# Patient Record
Sex: MALE | Race: OTHER | NOT HISPANIC OR LATINO
[De-identification: names, ages, dates, MRNs, and addresses within clinical notes are randomized per-mention and may not be internally consistent; named-entity substitution may affect disease eponyms.]

---

## 2022-07-12 ENCOUNTER — APPOINTMENT (OUTPATIENT)
Dept: SURGICAL ONCOLOGY | Facility: CLINIC | Age: 77
End: 2022-07-12

## 2022-07-12 VITALS
HEART RATE: 72 BPM | SYSTOLIC BLOOD PRESSURE: 126 MMHG | OXYGEN SATURATION: 99 % | TEMPERATURE: 97.7 F | HEIGHT: 63 IN | DIASTOLIC BLOOD PRESSURE: 79 MMHG | BODY MASS INDEX: 28.35 KG/M2 | RESPIRATION RATE: 16 BRPM | WEIGHT: 160 LBS

## 2022-07-12 DIAGNOSIS — E11.9 TYPE 2 DIABETES MELLITUS W/OUT COMPLICATIONS: ICD-10-CM

## 2022-07-12 DIAGNOSIS — Z78.9 OTHER SPECIFIED HEALTH STATUS: ICD-10-CM

## 2022-07-12 DIAGNOSIS — I10 ESSENTIAL (PRIMARY) HYPERTENSION: ICD-10-CM

## 2022-07-12 DIAGNOSIS — N40.0 BENIGN PROSTATIC HYPERPLASIA WITHOUT LOWER URINARY TRACT SYMPMS: ICD-10-CM

## 2022-07-12 PROBLEM — Z00.00 ENCOUNTER FOR PREVENTIVE HEALTH EXAMINATION: Status: ACTIVE | Noted: 2022-07-12

## 2022-07-12 PROCEDURE — 99204 OFFICE O/P NEW MOD 45 MIN: CPT

## 2022-07-14 PROBLEM — I10 HTN (HYPERTENSION): Status: ACTIVE | Noted: 2022-07-14

## 2022-07-14 PROBLEM — Z78.9 NO FAMILY HISTORY OF CANCER: Status: ACTIVE | Noted: 2022-07-14

## 2022-07-14 PROBLEM — N40.0 BPH (BENIGN PROSTATIC HYPERPLASIA): Status: ACTIVE | Noted: 2022-07-14

## 2022-07-14 PROBLEM — E11.9 DIABETES: Status: ACTIVE | Noted: 2022-07-14

## 2022-07-14 RX ORDER — METFORMIN HYDROCHLORIDE 1000 MG/1
1000 TABLET, COATED ORAL
Refills: 0 | Status: ACTIVE | COMMUNITY

## 2022-07-14 RX ORDER — FINASTERIDE 5 MG/1
5 TABLET, FILM COATED ORAL DAILY
Refills: 0 | Status: ACTIVE | COMMUNITY

## 2022-07-14 RX ORDER — TAMSULOSIN HCL 0.4 MG
0.4 CAPSULE ORAL
Refills: 0 | Status: ACTIVE | COMMUNITY

## 2022-07-14 RX ORDER — AMLODIPINE BESYLATE 5 MG/1
5 TABLET ORAL DAILY
Refills: 0 | Status: ACTIVE | COMMUNITY

## 2022-07-14 RX ORDER — LISINOPRIL 20 MG/1
20 TABLET ORAL DAILY
Refills: 0 | Status: ACTIVE | COMMUNITY

## 2022-07-15 ENCOUNTER — TRANSCRIPTION ENCOUNTER (OUTPATIENT)
Age: 77
End: 2022-07-15

## 2022-07-19 ENCOUNTER — OUTPATIENT (OUTPATIENT)
Dept: OUTPATIENT SERVICES | Facility: HOSPITAL | Age: 77
LOS: 1 days | End: 2022-07-19

## 2022-07-19 VITALS
OXYGEN SATURATION: 99 % | HEIGHT: 61 IN | SYSTOLIC BLOOD PRESSURE: 130 MMHG | TEMPERATURE: 97 F | RESPIRATION RATE: 16 BRPM | DIASTOLIC BLOOD PRESSURE: 82 MMHG | HEART RATE: 74 BPM | WEIGHT: 160.06 LBS

## 2022-07-19 DIAGNOSIS — D49.0 NEOPLASM OF UNSPECIFIED BEHAVIOR OF DIGESTIVE SYSTEM: ICD-10-CM

## 2022-07-19 DIAGNOSIS — E11.9 TYPE 2 DIABETES MELLITUS WITHOUT COMPLICATIONS: ICD-10-CM

## 2022-07-19 DIAGNOSIS — Z98.890 OTHER SPECIFIED POSTPROCEDURAL STATES: Chronic | ICD-10-CM

## 2022-07-19 DIAGNOSIS — Z87.898 PERSONAL HISTORY OF OTHER SPECIFIED CONDITIONS: ICD-10-CM

## 2022-07-19 DIAGNOSIS — Z87.438 PERSONAL HISTORY OF OTHER DISEASES OF MALE GENITAL ORGANS: ICD-10-CM

## 2022-07-19 DIAGNOSIS — I10 ESSENTIAL (PRIMARY) HYPERTENSION: ICD-10-CM

## 2022-07-19 LAB
A1C WITH ESTIMATED AVERAGE GLUCOSE RESULT: 6.6 % — HIGH (ref 4–5.6)
ALBUMIN SERPL ELPH-MCNC: 4.5 G/DL — SIGNIFICANT CHANGE UP (ref 3.3–5)
ALP SERPL-CCNC: 66 U/L — SIGNIFICANT CHANGE UP (ref 40–120)
ALT FLD-CCNC: 10 U/L — SIGNIFICANT CHANGE UP (ref 4–41)
ANION GAP SERPL CALC-SCNC: 12 MMOL/L — SIGNIFICANT CHANGE UP (ref 7–14)
APTT BLD: 28.6 SEC — SIGNIFICANT CHANGE UP (ref 27–36.3)
AST SERPL-CCNC: 14 U/L — SIGNIFICANT CHANGE UP (ref 4–40)
BILIRUB SERPL-MCNC: 0.2 MG/DL — SIGNIFICANT CHANGE UP (ref 0.2–1.2)
BLD GP AB SCN SERPL QL: NEGATIVE — SIGNIFICANT CHANGE UP
BUN SERPL-MCNC: 20 MG/DL — SIGNIFICANT CHANGE UP (ref 7–23)
CALCIUM SERPL-MCNC: 9.6 MG/DL — SIGNIFICANT CHANGE UP (ref 8.4–10.5)
CHLORIDE SERPL-SCNC: 99 MMOL/L — SIGNIFICANT CHANGE UP (ref 98–107)
CO2 SERPL-SCNC: 26 MMOL/L — SIGNIFICANT CHANGE UP (ref 22–31)
CREAT SERPL-MCNC: 0.76 MG/DL — SIGNIFICANT CHANGE UP (ref 0.5–1.3)
EGFR: 93 ML/MIN/1.73M2 — SIGNIFICANT CHANGE UP
ESTIMATED AVERAGE GLUCOSE: 143 — SIGNIFICANT CHANGE UP
GLUCOSE SERPL-MCNC: 96 MG/DL — SIGNIFICANT CHANGE UP (ref 70–99)
HCT VFR BLD CALC: 40.6 % — SIGNIFICANT CHANGE UP (ref 39–50)
HGB BLD-MCNC: 13 G/DL — SIGNIFICANT CHANGE UP (ref 13–17)
INR BLD: 1.11 RATIO — SIGNIFICANT CHANGE UP (ref 0.88–1.16)
MCHC RBC-ENTMCNC: 31.3 PG — SIGNIFICANT CHANGE UP (ref 27–34)
MCHC RBC-ENTMCNC: 32 GM/DL — SIGNIFICANT CHANGE UP (ref 32–36)
MCV RBC AUTO: 97.6 FL — SIGNIFICANT CHANGE UP (ref 80–100)
NRBC # BLD: 0 /100 WBCS — SIGNIFICANT CHANGE UP
NRBC # FLD: 0 K/UL — SIGNIFICANT CHANGE UP
PLATELET # BLD AUTO: 201 K/UL — SIGNIFICANT CHANGE UP (ref 150–400)
POTASSIUM SERPL-MCNC: 4.6 MMOL/L — SIGNIFICANT CHANGE UP (ref 3.5–5.3)
POTASSIUM SERPL-SCNC: 4.6 MMOL/L — SIGNIFICANT CHANGE UP (ref 3.5–5.3)
PROT SERPL-MCNC: 7.3 G/DL — SIGNIFICANT CHANGE UP (ref 6–8.3)
PROTHROM AB SERPL-ACNC: 12.9 SEC — SIGNIFICANT CHANGE UP (ref 10.5–13.4)
RBC # BLD: 4.16 M/UL — LOW (ref 4.2–5.8)
RBC # FLD: 14.4 % — SIGNIFICANT CHANGE UP (ref 10.3–14.5)
RH IG SCN BLD-IMP: POSITIVE — SIGNIFICANT CHANGE UP
SODIUM SERPL-SCNC: 137 MMOL/L — SIGNIFICANT CHANGE UP (ref 135–145)
WBC # BLD: 5.05 K/UL — SIGNIFICANT CHANGE UP (ref 3.8–10.5)
WBC # FLD AUTO: 5.05 K/UL — SIGNIFICANT CHANGE UP (ref 3.8–10.5)

## 2022-07-19 PROCEDURE — 93010 ELECTROCARDIOGRAM REPORT: CPT

## 2022-07-19 RX ORDER — FINASTERIDE 5 MG/1
1 TABLET, FILM COATED ORAL
Qty: 0 | Refills: 0 | DISCHARGE

## 2022-07-19 RX ORDER — TAMSULOSIN HYDROCHLORIDE 0.4 MG/1
1 CAPSULE ORAL
Qty: 0 | Refills: 0 | DISCHARGE

## 2022-07-19 NOTE — H&P PST ADULT - HISTORY OF PRESENT ILLNESS
Pt is a 77 y.o. male ; pt  reports hematuria ; pt s/p CT scan ; pt states " the pancreas has a dilated duct " Pt to surgeon ; pt now presents for Whipple    Pt denies abdominal pain , denies n/v, denies jaundice  Pt is a 77 y.o. male ; pt  reports hematuria ; pt states a w/u was done ;  s/p CT scan ; pt states " the pancreas has a dilated duct " Pt to surgeon ; pt now presents for Whipple    Pt denies abdominal pain , denies n/v, denies jaundice  Pt is a 77 y.o. male ; pt  reports hematuria ; pt states a w/u was done ;  s/p CT scan ; pt states " the pancreas  duct is dilated " Pt to surgeon ;pt s/p EUS ;  pt now presents for Whipple    Pt denies abdominal pain , denies n/v, denies jaundice

## 2022-07-19 NOTE — H&P PST ADULT - PROBLEM SELECTOR PLAN 1
[place]    Pre op instructions including Hibiclens with teach back reviewed with pt ; pt verbalized good understanding of pre op instructions    Pt aware regarding Pre op Covid test ; pt states will review with surgeons office regarding Location    Pt with h/o HTN, DM. BPH ; Dr Brambila to provide pre op Medical clearance

## 2022-07-19 NOTE — H&P PST ADULT - ANESTHESIA, PREVIOUS REACTION, PROFILE
Pt denies h/o general anesthesia ; s/p IV sedation for Colonoscopy , Endoscopy . Pt denies family history anesthesia complications Pt denies h/o general anesthesia ; s/p IV sedation for Colonoscopy , Endoscopy . Pt denies family history anesthesia complications/none

## 2022-07-19 NOTE — H&P PST ADULT - NSICDXPASTMEDICALHX_GEN_ALL_CORE_FT
PAST MEDICAL HISTORY:  COVID 1/22    Diabetes mellitus  am    Eczema     History of BPH     HTN (hypertension)

## 2022-07-26 NOTE — ASU PATIENT PROFILE, ADULT - FALL HARM RISK - RISK INTERVENTIONS

## 2022-07-26 NOTE — ASU PATIENT PROFILE, ADULT - ANESTHESIA, PREVIOUS REACTION, PROFILE
Pt denies h/o general anesthesia ; s/p IV sedation for Colonoscopy , Endoscopy . Pt denies family history anesthesia complications/none

## 2022-07-27 ENCOUNTER — INPATIENT (INPATIENT)
Facility: HOSPITAL | Age: 77
LOS: 5 days | Discharge: HOME HEALTH | End: 2022-08-02
Attending: SURGERY | Admitting: SURGERY

## 2022-07-27 ENCOUNTER — RESULT REVIEW (OUTPATIENT)
Age: 77
End: 2022-07-27

## 2022-07-27 ENCOUNTER — APPOINTMENT (OUTPATIENT)
Dept: SURGICAL ONCOLOGY | Facility: HOSPITAL | Age: 77
End: 2022-07-27

## 2022-07-27 VITALS
RESPIRATION RATE: 16 BRPM | WEIGHT: 160.06 LBS | SYSTOLIC BLOOD PRESSURE: 133 MMHG | TEMPERATURE: 97 F | OXYGEN SATURATION: 99 % | HEART RATE: 82 BPM | DIASTOLIC BLOOD PRESSURE: 74 MMHG | HEIGHT: 61 IN

## 2022-07-27 DIAGNOSIS — Z98.890 OTHER SPECIFIED POSTPROCEDURAL STATES: Chronic | ICD-10-CM

## 2022-07-27 DIAGNOSIS — D49.0 NEOPLASM OF UNSPECIFIED BEHAVIOR OF DIGESTIVE SYSTEM: ICD-10-CM

## 2022-07-27 LAB
ALBUMIN SERPL ELPH-MCNC: 3.8 G/DL — SIGNIFICANT CHANGE UP (ref 3.3–5)
ALP SERPL-CCNC: 48 U/L — SIGNIFICANT CHANGE UP (ref 40–120)
ALT FLD-CCNC: 61 U/L — HIGH (ref 4–41)
AMYLASE P1 CFR SERPL: 41 U/L — SIGNIFICANT CHANGE UP (ref 25–125)
ANION GAP SERPL CALC-SCNC: 19 MMOL/L — HIGH (ref 7–14)
APTT BLD: 26.4 SEC — LOW (ref 27–36.3)
AST SERPL-CCNC: 61 U/L — HIGH (ref 4–40)
BILIRUB SERPL-MCNC: 0.5 MG/DL — SIGNIFICANT CHANGE UP (ref 0.2–1.2)
BUN SERPL-MCNC: 18 MG/DL — SIGNIFICANT CHANGE UP (ref 7–23)
CALCIUM SERPL-MCNC: 8.3 MG/DL — LOW (ref 8.4–10.5)
CHLORIDE SERPL-SCNC: 99 MMOL/L — SIGNIFICANT CHANGE UP (ref 98–107)
CO2 SERPL-SCNC: 17 MMOL/L — LOW (ref 22–31)
CREAT SERPL-MCNC: 0.65 MG/DL — SIGNIFICANT CHANGE UP (ref 0.5–1.3)
EGFR: 98 ML/MIN/1.73M2 — SIGNIFICANT CHANGE UP
GAS PNL BLDA: SIGNIFICANT CHANGE UP
GLUCOSE BLDC GLUCOMTR-MCNC: 135 MG/DL — HIGH (ref 70–99)
GLUCOSE BLDC GLUCOMTR-MCNC: 165 MG/DL — HIGH (ref 70–99)
GLUCOSE SERPL-MCNC: 188 MG/DL — HIGH (ref 70–99)
HCT VFR BLD CALC: 38.3 % — LOW (ref 39–50)
HGB BLD-MCNC: 12.4 G/DL — LOW (ref 13–17)
INR BLD: 1.23 RATIO — HIGH (ref 0.88–1.16)
MAGNESIUM SERPL-MCNC: 1.2 MG/DL — LOW (ref 1.6–2.6)
MCHC RBC-ENTMCNC: 30.8 PG — SIGNIFICANT CHANGE UP (ref 27–34)
MCHC RBC-ENTMCNC: 32.4 GM/DL — SIGNIFICANT CHANGE UP (ref 32–36)
MCV RBC AUTO: 95 FL — SIGNIFICANT CHANGE UP (ref 80–100)
NRBC # BLD: 0 /100 WBCS — SIGNIFICANT CHANGE UP
NRBC # FLD: 0 K/UL — SIGNIFICANT CHANGE UP
PHOSPHATE SERPL-MCNC: 3.4 MG/DL — SIGNIFICANT CHANGE UP (ref 2.5–4.5)
PLATELET # BLD AUTO: 170 K/UL — SIGNIFICANT CHANGE UP (ref 150–400)
POTASSIUM SERPL-MCNC: 3.8 MMOL/L — SIGNIFICANT CHANGE UP (ref 3.5–5.3)
POTASSIUM SERPL-SCNC: 3.8 MMOL/L — SIGNIFICANT CHANGE UP (ref 3.5–5.3)
PROT SERPL-MCNC: 5.8 G/DL — LOW (ref 6–8.3)
PROTHROM AB SERPL-ACNC: 14.3 SEC — HIGH (ref 10.5–13.4)
RBC # BLD: 4.03 M/UL — LOW (ref 4.2–5.8)
RBC # FLD: 14.1 % — SIGNIFICANT CHANGE UP (ref 10.3–14.5)
RH IG SCN BLD-IMP: POSITIVE — SIGNIFICANT CHANGE UP
SODIUM SERPL-SCNC: 135 MMOL/L — SIGNIFICANT CHANGE UP (ref 135–145)
WBC # BLD: 10.76 K/UL — HIGH (ref 3.8–10.5)
WBC # FLD AUTO: 10.76 K/UL — HIGH (ref 3.8–10.5)

## 2022-07-27 PROCEDURE — 88309 TISSUE EXAM BY PATHOLOGIST: CPT | Mod: 26

## 2022-07-27 PROCEDURE — 88304 TISSUE EXAM BY PATHOLOGIST: CPT | Mod: 26

## 2022-07-27 PROCEDURE — 49905 OMENTAL FLAP INTRA-ABDOM: CPT

## 2022-07-27 PROCEDURE — 48150 PARTIAL REMOVAL OF PANCREAS: CPT

## 2022-07-27 PROCEDURE — 88307 TISSUE EXAM BY PATHOLOGIST: CPT | Mod: 26

## 2022-07-27 PROCEDURE — 99222 1ST HOSP IP/OBS MODERATE 55: CPT

## 2022-07-27 PROCEDURE — 97607 NEG PRS WND THR NDME<=50SQCM: CPT

## 2022-07-27 DEVICE — STAPLER ETHICON ECHELON ENDOPATH VASCULAR 35MM WHITE RELOAD: Type: IMPLANTABLE DEVICE | Status: FUNCTIONAL

## 2022-07-27 DEVICE — STAPLER COVIDIEN TRI-STAPLE 60MM BLACK RELOAD: Type: IMPLANTABLE DEVICE | Status: FUNCTIONAL

## 2022-07-27 DEVICE — STAPLER ETHICON ECHELON ENDOPATH GRIP SURFACE 60MM WHITE RELOAD: Type: IMPLANTABLE DEVICE | Status: FUNCTIONAL

## 2022-07-27 RX ORDER — SODIUM CHLORIDE 9 MG/ML
1000 INJECTION, SOLUTION INTRAVENOUS
Refills: 0 | Status: DISCONTINUED | OUTPATIENT
Start: 2022-07-27 | End: 2022-07-28

## 2022-07-27 RX ORDER — HYDROMORPHONE HYDROCHLORIDE 2 MG/ML
1 INJECTION INTRAMUSCULAR; INTRAVENOUS; SUBCUTANEOUS
Refills: 0 | Status: DISCONTINUED | OUTPATIENT
Start: 2022-07-27 | End: 2022-07-27

## 2022-07-27 RX ORDER — LISINOPRIL 2.5 MG/1
1 TABLET ORAL
Qty: 0 | Refills: 0 | DISCHARGE

## 2022-07-27 RX ORDER — SODIUM CHLORIDE 9 MG/ML
1000 INJECTION, SOLUTION INTRAVENOUS
Refills: 0 | Status: DISCONTINUED | OUTPATIENT
Start: 2022-07-27 | End: 2022-07-27

## 2022-07-27 RX ORDER — HYDROMORPHONE HYDROCHLORIDE 2 MG/ML
250 INJECTION INTRAMUSCULAR; INTRAVENOUS; SUBCUTANEOUS
Refills: 0 | Status: DISCONTINUED | OUTPATIENT
Start: 2022-07-27 | End: 2022-08-01

## 2022-07-27 RX ORDER — ONDANSETRON 8 MG/1
4 TABLET, FILM COATED ORAL EVERY 6 HOURS
Refills: 0 | Status: DISCONTINUED | OUTPATIENT
Start: 2022-07-27 | End: 2022-08-02

## 2022-07-27 RX ORDER — METFORMIN HYDROCHLORIDE 850 MG/1
2 TABLET ORAL
Qty: 0 | Refills: 0 | DISCHARGE

## 2022-07-27 RX ORDER — PANTOPRAZOLE SODIUM 20 MG/1
40 TABLET, DELAYED RELEASE ORAL EVERY 24 HOURS
Refills: 0 | Status: DISCONTINUED | OUTPATIENT
Start: 2022-07-27 | End: 2022-07-31

## 2022-07-27 RX ORDER — AMLODIPINE BESYLATE 2.5 MG/1
1 TABLET ORAL
Qty: 0 | Refills: 0 | DISCHARGE

## 2022-07-27 RX ORDER — FINASTERIDE 5 MG/1
1 TABLET, FILM COATED ORAL
Qty: 0 | Refills: 0 | DISCHARGE

## 2022-07-27 RX ORDER — NALOXONE HYDROCHLORIDE 4 MG/.1ML
0.1 SPRAY NASAL
Refills: 0 | Status: DISCONTINUED | OUTPATIENT
Start: 2022-07-27 | End: 2022-08-02

## 2022-07-27 RX ORDER — ONDANSETRON 8 MG/1
4 TABLET, FILM COATED ORAL ONCE
Refills: 0 | Status: DISCONTINUED | OUTPATIENT
Start: 2022-07-27 | End: 2022-07-27

## 2022-07-27 RX ORDER — ACETAMINOPHEN 500 MG
1000 TABLET ORAL ONCE
Refills: 0 | Status: DISCONTINUED | OUTPATIENT
Start: 2022-07-27 | End: 2022-07-27

## 2022-07-27 RX ORDER — TAMSULOSIN HYDROCHLORIDE 0.4 MG/1
1 CAPSULE ORAL
Qty: 0 | Refills: 0 | DISCHARGE

## 2022-07-27 RX ORDER — CALCIUM GLUCONATE 100 MG/ML
2 VIAL (ML) INTRAVENOUS ONCE
Refills: 0 | Status: COMPLETED | OUTPATIENT
Start: 2022-07-27 | End: 2022-07-27

## 2022-07-27 RX ORDER — POTASSIUM CHLORIDE 20 MEQ
10 PACKET (EA) ORAL
Refills: 0 | Status: COMPLETED | OUTPATIENT
Start: 2022-07-27 | End: 2022-07-27

## 2022-07-27 RX ORDER — HEPARIN SODIUM 5000 [USP'U]/ML
5000 INJECTION INTRAVENOUS; SUBCUTANEOUS EVERY 8 HOURS
Refills: 0 | Status: DISCONTINUED | OUTPATIENT
Start: 2022-07-27 | End: 2022-08-01

## 2022-07-27 RX ORDER — HYDROMORPHONE HYDROCHLORIDE 2 MG/ML
0.5 INJECTION INTRAMUSCULAR; INTRAVENOUS; SUBCUTANEOUS
Refills: 0 | Status: DISCONTINUED | OUTPATIENT
Start: 2022-07-27 | End: 2022-07-27

## 2022-07-27 RX ORDER — ACETAMINOPHEN 500 MG
1000 TABLET ORAL EVERY 6 HOURS
Refills: 0 | Status: COMPLETED | OUTPATIENT
Start: 2022-07-27 | End: 2022-07-28

## 2022-07-27 RX ORDER — MAGNESIUM SULFATE 500 MG/ML
2 VIAL (ML) INJECTION ONCE
Refills: 0 | Status: COMPLETED | OUTPATIENT
Start: 2022-07-27 | End: 2022-07-27

## 2022-07-27 RX ORDER — ACETAMINOPHEN 500 MG
1000 TABLET ORAL EVERY 6 HOURS
Refills: 0 | Status: DISCONTINUED | OUTPATIENT
Start: 2022-07-27 | End: 2022-07-27

## 2022-07-27 RX ORDER — HYDROMORPHONE HYDROCHLORIDE 2 MG/ML
250 INJECTION INTRAMUSCULAR; INTRAVENOUS; SUBCUTANEOUS
Refills: 0 | Status: DISCONTINUED | OUTPATIENT
Start: 2022-07-27 | End: 2022-07-27

## 2022-07-27 RX ADMIN — HYDROMORPHONE HYDROCHLORIDE 250 MILLILITER(S): 2 INJECTION INTRAMUSCULAR; INTRAVENOUS; SUBCUTANEOUS at 19:13

## 2022-07-27 RX ADMIN — Medication 100 MILLIEQUIVALENT(S): at 15:25

## 2022-07-27 RX ADMIN — PANTOPRAZOLE SODIUM 40 MILLIGRAM(S): 20 TABLET, DELAYED RELEASE ORAL at 16:21

## 2022-07-27 RX ADMIN — Medication 400 MILLIGRAM(S): at 23:28

## 2022-07-27 RX ADMIN — HYDROMORPHONE HYDROCHLORIDE 250 MILLILITER(S): 2 INJECTION INTRAMUSCULAR; INTRAVENOUS; SUBCUTANEOUS at 13:53

## 2022-07-27 RX ADMIN — Medication 25 GRAM(S): at 16:21

## 2022-07-27 RX ADMIN — Medication 100 MILLIEQUIVALENT(S): at 17:26

## 2022-07-27 RX ADMIN — SODIUM CHLORIDE 100 MILLILITER(S): 9 INJECTION, SOLUTION INTRAVENOUS at 23:28

## 2022-07-27 RX ADMIN — HEPARIN SODIUM 5000 UNIT(S): 5000 INJECTION INTRAVENOUS; SUBCUTANEOUS at 23:28

## 2022-07-27 RX ADMIN — Medication 1000 MILLIGRAM(S): at 23:35

## 2022-07-27 RX ADMIN — Medication 200 GRAM(S): at 16:52

## 2022-07-27 NOTE — BRIEF OPERATIVE NOTE - NSICDXBRIEFPROCEDURE_GEN_ALL_CORE_FT
PROCEDURES:  Pancreatectomy, Whipple type, with pancreatojejunostomy 27-Jul-2022 13:41:22  Miguel Terrazas

## 2022-07-27 NOTE — BRIEF OPERATIVE NOTE - OPERATION/FINDINGS
ex. lap, no evidence of metastatic disease, duodenum kocherized, lesser sac entered, hepatoduodenal ligament dissected, GDA identified and ligated, CBD ligated, tunnel created posterior to pancreatic neck, pancreatic neck divided, distal stomach divided, jejunum divided proximally, hemostasis achieved, pancreaticojejunostomy created using interrupted 5-0PDS, hepaticojejunostomy created using interrupted 5-0PDS, gastrojejunostomy completed, Coleman drain x2 place superior anterior to HJ and PJ, and inferior drain posterior to HJ and PJ

## 2022-07-27 NOTE — CONSULT NOTE ADULT - SUBJECTIVE AND OBJECTIVE BOX
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 Ahn. SICU consulted for hemodynamic monitoring in the post-operative period.    Surgery Information -   Case Duration:        EBL:  IV Fluids:         Blood Products:         Complications:        PAST MEDICAL & SURGICAL HISTORY:  HTN (hypertension)  History of BPH  Diabetes mellitus  am  COVID (1/22)  Eczema S/P colonoscopy  S/P endoscopy    Allergies: NKDA    FAMILY HISTORY:  No pertinent family history in first degree relatives    ADVANCE DIRECTIVES: Full Code    REVIEW OF SYSTEMS:  General: Non-Contributory  Skin/Breast: Non-Contributory  Ophthalmologic: Non-Contributory  ENMT: Non-Contributory  Respiratory and Thorax: Non-Contributory  Cardiovascular: Non-Contributory  Gastrointestinal: Non-Contributory  Genitourinary: Non-Contributory  Musculoskeletal: Non-Contributory  Neurological: Non-Contributory  Psychiatric: Non-Contributory  Hematology/Lymphatics: Non-Contributory  Endocrine: Non-Contributory  Allergic/Immunologic: Non-Contributory  --------------------------------------------------------------------------------------  HOME MEDICATIONS:   MEDICATIONS  (STANDING):  hydromorphone (10 MICROgram(s)/mL) + bupivacaine 0.0625% in 0.9% Sodium Chloride PCEA 250 milliLiter(s) Epidural PCA Continuous  lactated ringers. 1000 milliLiter(s) (30 mL/Hr) IV Continuous <Continuous>    MEDICATIONS  (PRN):  HYDROmorphone  Injectable 0.5 milliGRAM(s) IV Push every 10 minutes PRN Moderate Pain (4 - 6)  HYDROmorphone  Injectable 1 milliGRAM(s) IV Push every 10 minutes PRN Severe Pain (7 - 10)  hydromorphone (10 MICROgram(s)/mL) + bupivacaine 0.0625% in 0.9% Sodium Chloride PCEA Rescue Clinician  Bolus 5 milliLiter(s) Epidural every 15 minutes PRN for Pain Score greater than 6  naloxone Injectable 0.1 milliGRAM(s) IV Push every 3 minutes PRN For ANY of the following changes in patient status:  A. RR LESS THAN 10 breaths per minute, B. Oxygen saturation LESS THAN 90%, C. Sedation score of 6  ondansetron Injectable 4 milliGRAM(s) IV Push every 6 hours PRN Nausea  ondansetron Injectable 4 milliGRAM(s) IV Push once PRN Nausea and/or Vomiting  --------------------------------------------------------------------------------------  ICU Vital Signs Last 24 Hrs  T(C): 36.1 (27 Jul 2022 06:31), Max: 36.1 (27 Jul 2022 06:31)  T(F): 97 (27 Jul 2022 06:31), Max: 97 (27 Jul 2022 06:31)  HR: 82 (27 Jul 2022 06:31) (82 - 82)  BP: 133/74 (27 Jul 2022 06:31) (133/74 - 133/74)  RR: 16 (27 Jul 2022 06:31) (16 - 16)  SpO2: 99% (27 Jul 2022 06:31) (99% - 99%)  --------------------------------------------------------------------------------------  I&O's Summary  --------------------------------------------------------------------------------------  PHYSICAL EXAM    NEUROLOGY  Exam: Normal, NAD, alert, oriented x 3, no focal deficits.    HEENT  Exam: Normocephalic, atraumatic. EOMI.    RESPIRATORY  Exam: Lungs clear to auscultation, Normal expansion/effort.  Mechanical Ventilation:     CARDIOVASCULAR  Exam: S1, S2.  Regular rate and rhythm.  Peripheral edema.    GI/NUTRITION  Exam: Abdomen soft, Non-tender, Non-distended.  Gastrostomy / Jejunostomy / Nasogastric tube in place.  Colostomy / Ileostomy.  Wound:   Current Diet: NPO    VASCULAR  Exam: Extremities warm, pink, well-perfused.     MUSCULOSKELETAL  Exam: All extremities moving spontaneously without limitations.    SKIN:  Exam: Good skin turgor, no skin breakdown.    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 SICU consulted for hemodynamic monitoring in the post-operative period.    Surgery Information -   EBL: 150cc  IV Fluids: 3L crystalloid, 500cc albumin      UOP: 650cc  Blood Products: N/A        Complications: none        PAST MEDICAL & SURGICAL HISTORY:  HTN (hypertension)  History of BPH  Diabetes mellitus  am  COVID (1/22)  Eczema S/P colonoscopy  S/P endoscopy    Allergies: NKDA    FAMILY HISTORY:  No pertinent family history in first degree relatives    ADVANCE DIRECTIVES: Full Code    REVIEW OF SYSTEMS:  General: Non-Contributory  Skin/Breast: Non-Contributory  Ophthalmologic: Non-Contributory  ENMT: Non-Contributory  Respiratory and Thorax: Non-Contributory  Cardiovascular: Non-Contributory  Gastrointestinal: Non-Contributory  Genitourinary: Non-Contributory  Musculoskeletal: Non-Contributory  Neurological: Non-Contributory  Psychiatric: Non-Contributory  Hematology/Lymphatics: Non-Contributory  Endocrine: Non-Contributory  Allergic/Immunologic: Non-Contributory  --------------------------------------------------------------------------------------  HOME MEDICATIONS:   MEDICATIONS  (STANDING):  hydromorphone (10 MICROgram(s)/mL) + bupivacaine 0.0625% in 0.9% Sodium Chloride PCEA 250 milliLiter(s) Epidural PCA Continuous  lactated ringers. 1000 milliLiter(s) (30 mL/Hr) IV Continuous <Continuous>    MEDICATIONS  (PRN):  HYDROmorphone  Injectable 0.5 milliGRAM(s) IV Push every 10 minutes PRN Moderate Pain (4 - 6)  HYDROmorphone  Injectable 1 milliGRAM(s) IV Push every 10 minutes PRN Severe Pain (7 - 10)  hydromorphone (10 MICROgram(s)/mL) + bupivacaine 0.0625% in 0.9% Sodium Chloride PCEA Rescue Clinician  Bolus 5 milliLiter(s) Epidural every 15 minutes PRN for Pain Score greater than 6  naloxone Injectable 0.1 milliGRAM(s) IV Push every 3 minutes PRN For ANY of the following changes in patient status:  A. RR LESS THAN 10 breaths per minute, B. Oxygen saturation LESS THAN 90%, C. Sedation score of 6  ondansetron Injectable 4 milliGRAM(s) IV Push every 6 hours PRN Nausea  ondansetron Injectable 4 milliGRAM(s) IV Push once PRN Nausea and/or Vomiting  --------------------------------------------------------------------------------------  ICU Vital Signs Last 24 Hrs  T(C): 36.1 (27 Jul 2022 06:31), Max: 36.1 (27 Jul 2022 06:31)  T(F): 97 (27 Jul 2022 06:31), Max: 97 (27 Jul 2022 06:31)  HR: 82 (27 Jul 2022 06:31) (82 - 82)  BP: 133/74 (27 Jul 2022 06:31) (133/74 - 133/74)  RR: 16 (27 Jul 2022 06:31) (16 - 16)  SpO2: 99% (27 Jul 2022 06:31) (99% - 99%)  --------------------------------------------------------------------------------------  I&O's Summary  --------------------------------------------------------------------------------------  PHYSICAL EXAM    NEUROLOGY  Exam: Normal, NAD, alert, oriented x 3, no focal deficits.    HEENT  Exam: Normocephalic, atraumatic. EOMI.    RESPIRATORY  Exam: Lungs clear to auscultation, Normal expansion/effort.    CARDIOVASCULAR  Exam: S1, S2.  Regular rate and rhythm.  Warm, well perfused.     GI/NUTRITION  Exam: Abdomen soft, Non-tender, Non-distended.  PREVENA in place over midline wound. HAIM drains x2 serosanguinous, holding suction   Current Diet: NPO    VASCULAR  Exam: Extremities warm, pink, well-perfused.     MUSCULOSKELETAL  Exam: All extremities moving spontaneously without limitations.    SKIN:  Exam: Good skin turgor, no skin breakdown.    Tubes/Lines/Drains  [x] Peripheral IV  [] Central Venous Line     	[] R	[] L	[] IJ	[] Fem	[] SC	Date Placed:   [x] Arterial Line		            [] R	[x] L	[] Fem	[x] Rad	[] Ax	Date Placed: 7/27/2022  [] PICC:         	  [] Midline		  [] Mediport  [] Urinary Catheter		Date Placed: 7/27/2022  --------------------------------------------------------------------------------------  LABS

## 2022-07-27 NOTE — PATIENT PROFILE ADULT - FALL HARM RISK - HARM RISK INTERVENTIONS

## 2022-07-27 NOTE — CONSULT NOTE ADULT - ATTENDING COMMENTS
I agree with the history, physical, and plan, which I have reviewed and edited where appropriate.  I agree with notes/assessment of health care providers on my service.  I have personally examined the patient.  I was physically present for the key portions of the evaluation and management (E/M) service provided.  I reviewed data and laboratory tests/x-rays and all pertinent electronic images.  The patient is a critical care patient with life threatening hemodynamic and metabolic instability in SICU.  Risk benefit analyses discussed.    The patient is in SICU with diagnosis mentioned in the note.    The plan is specified below.    ASSESSMENT: 77M w/PMH significant for HTN, T2DM, BPH w/hematuria, eczema, and pancreatic ductal dilation, now s/p Whipple for IPMN (7/27). SICU consulted for hemodynamic monitoring in the post-operative period.    PLAN:     Neurologic:   - Pain control with PCEA, acetaminophen IV ATC, q6h x2 doses     Respiratory:   - Monitor SaO2     Cardiovascular:   - Maintain MAP >65    Gastrointestinal/Nutrition:   - NPO/NGT  - Protonix qd     Renal/Genitourinary:   - LR @ 100cc/hr  - jessica    Hematologic:   - VTE ppx with SQH     Infectious Disease:   - observe off abx    Endocrine:   - ISS

## 2022-07-27 NOTE — CONSULT NOTE ADULT - ASSESSMENT
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 Ahn. SICU consulted for hemodynamic monitoring in the post-operative period.    PLAN:  ***  Neurologic:   Respiratory:   Cardiovascular:   Gastrointestinal/Nutrition:   Renal/Genitourinary:   Hematologic:   Infectious Disease:   Tubes/Lines/Drains:   Endocrine:   Disposition: ASSESSMENT: 77M w/PMH significant for HTN, T2DM, BPH w/hematuria, eczema, and pancreatic ductal dilation, now s/p Whipple for IPMN. SICU consulted for hemodynamic monitoring in the post-operative period.    PLAN:     Neurologic:     Respiratory:     Cardiovascular:     Gastrointestinal/Nutrition:     Renal/Genitourinary:     Hematologic:     Infectious Disease:     Endocrine:   - ISS, monitor FS    Tubes/Lines/Drains:   - Coleman drain x2; inferior posterior, superior anterior  - Macario  - L radial a line   - PIV     Disposition:  - SICU  ASSESSMENT: 77M w/PMH significant for HTN, T2DM, BPH w/hematuria, eczema, and pancreatic ductal dilation, now s/p Whipple for IPMN (7/27). SICU consulted for hemodynamic monitoring in the post-operative period.    PLAN:     Neurologic:   - Pain control with PCEA, acetaminophen IV ATC,     Respiratory:       Cardiovascular:   - Maintain MAP >65    Gastrointestinal/Nutrition:   - NPO/NGT  - Protonix qd     Renal/Genitourinary:   - LR @ 100cc/hr  - Monitor electrolytes, replete PRN   - Strict Is/Os    Hematologic:   - VTE ppx with SQH, SCDs bilaterally     Infectious Disease:     Endocrine:   - ISS, monitor FS    Tubes/Lines/Drains:   - NGT  - Coleman drain x2; inferior posterior, superior anterior  - Macario  - L radial a line   - PIV     Disposition:  - SICU  ASSESSMENT: 77M w/PMH significant for HTN, T2DM, BPH w/hematuria, eczema, and pancreatic ductal dilation, now s/p Whipple for IPMN (7/27). SICU consulted for hemodynamic monitoring in the post-operative period.    PLAN:     Neurologic:   - Pain control with PCEA, acetaminophen IV ATC, q6h x2 doses     Respiratory:   - Monitor SaO2     Cardiovascular:   - Maintain MAP >65    Gastrointestinal/Nutrition:   - NPO/NGT  - Protonix qd     Renal/Genitourinary:   - LR @ 100cc/hr  - Monitor electrolytes, replete PRN   - Strict Is/Os    Hematologic:   - VTE ppx with SQH, SCDs bilaterally     Infectious Disease:   - No active issues, monitor WBC    Endocrine:   - ISS, monitor FS    Tubes/Lines/Drains:   - NGT  - Coleman drain x2; inferior posterior, superior anterior  - Macario  - L radial a line   - PIV     Disposition:  - SICU

## 2022-07-28 LAB
ALBUMIN SERPL ELPH-MCNC: 2 G/DL — LOW (ref 3.3–5)
ALBUMIN SERPL ELPH-MCNC: 3.1 G/DL — LOW (ref 3.3–5)
ALBUMIN SERPL ELPH-MCNC: 3.3 G/DL — SIGNIFICANT CHANGE UP (ref 3.3–5)
ALP SERPL-CCNC: 28 U/L — LOW (ref 40–120)
ALP SERPL-CCNC: 42 U/L — SIGNIFICANT CHANGE UP (ref 40–120)
ALP SERPL-CCNC: 42 U/L — SIGNIFICANT CHANGE UP (ref 40–120)
ALT FLD-CCNC: 24 U/L — SIGNIFICANT CHANGE UP (ref 4–41)
ALT FLD-CCNC: 34 U/L — SIGNIFICANT CHANGE UP (ref 4–41)
ALT FLD-CCNC: 50 U/L — HIGH (ref 4–41)
AMYLASE FLD-CCNC: 33 U/L — SIGNIFICANT CHANGE UP
AMYLASE FLD-CCNC: 42 U/L — SIGNIFICANT CHANGE UP
ANION GAP SERPL CALC-SCNC: 10 MMOL/L — SIGNIFICANT CHANGE UP (ref 7–14)
ANION GAP SERPL CALC-SCNC: 13 MMOL/L — SIGNIFICANT CHANGE UP (ref 7–14)
ANION GAP SERPL CALC-SCNC: 13 MMOL/L — SIGNIFICANT CHANGE UP (ref 7–14)
APTT BLD: 19.8 SEC — LOW (ref 27–36.3)
AST SERPL-CCNC: 19 U/L — SIGNIFICANT CHANGE UP (ref 4–40)
AST SERPL-CCNC: 29 U/L — SIGNIFICANT CHANGE UP (ref 4–40)
AST SERPL-CCNC: 43 U/L — HIGH (ref 4–40)
BILIRUB SERPL-MCNC: 0.2 MG/DL — SIGNIFICANT CHANGE UP (ref 0.2–1.2)
BILIRUB SERPL-MCNC: 0.4 MG/DL — SIGNIFICANT CHANGE UP (ref 0.2–1.2)
BILIRUB SERPL-MCNC: 0.4 MG/DL — SIGNIFICANT CHANGE UP (ref 0.2–1.2)
BUN SERPL-MCNC: 12 MG/DL — SIGNIFICANT CHANGE UP (ref 7–23)
BUN SERPL-MCNC: 15 MG/DL — SIGNIFICANT CHANGE UP (ref 7–23)
BUN SERPL-MCNC: 16 MG/DL — SIGNIFICANT CHANGE UP (ref 7–23)
CALCIUM SERPL-MCNC: 7.1 MG/DL — LOW (ref 8.4–10.5)
CALCIUM SERPL-MCNC: 7.9 MG/DL — LOW (ref 8.4–10.5)
CALCIUM SERPL-MCNC: 8.4 MG/DL — SIGNIFICANT CHANGE UP (ref 8.4–10.5)
CHLORIDE SERPL-SCNC: 100 MMOL/L — SIGNIFICANT CHANGE UP (ref 98–107)
CHLORIDE SERPL-SCNC: 101 MMOL/L — SIGNIFICANT CHANGE UP (ref 98–107)
CHLORIDE SERPL-SCNC: 102 MMOL/L — SIGNIFICANT CHANGE UP (ref 98–107)
CO2 SERPL-SCNC: 16 MMOL/L — LOW (ref 22–31)
CO2 SERPL-SCNC: 20 MMOL/L — LOW (ref 22–31)
CO2 SERPL-SCNC: 22 MMOL/L — SIGNIFICANT CHANGE UP (ref 22–31)
CREAT SERPL-MCNC: 0.56 MG/DL — SIGNIFICANT CHANGE UP (ref 0.5–1.3)
CREAT SERPL-MCNC: 0.71 MG/DL — SIGNIFICANT CHANGE UP (ref 0.5–1.3)
CREAT SERPL-MCNC: 0.75 MG/DL — SIGNIFICANT CHANGE UP (ref 0.5–1.3)
EGFR: 102 ML/MIN/1.73M2 — SIGNIFICANT CHANGE UP
EGFR: 94 ML/MIN/1.73M2 — SIGNIFICANT CHANGE UP
EGFR: 95 ML/MIN/1.73M2 — SIGNIFICANT CHANGE UP
GLUCOSE BLDC GLUCOMTR-MCNC: 106 MG/DL — HIGH (ref 70–99)
GLUCOSE BLDC GLUCOMTR-MCNC: 112 MG/DL — HIGH (ref 70–99)
GLUCOSE BLDC GLUCOMTR-MCNC: 115 MG/DL — HIGH (ref 70–99)
GLUCOSE BLDC GLUCOMTR-MCNC: 115 MG/DL — HIGH (ref 70–99)
GLUCOSE BLDC GLUCOMTR-MCNC: 116 MG/DL — HIGH (ref 70–99)
GLUCOSE BLDC GLUCOMTR-MCNC: 135 MG/DL — HIGH (ref 70–99)
GLUCOSE SERPL-MCNC: 133 MG/DL — HIGH (ref 70–99)
GLUCOSE SERPL-MCNC: 136 MG/DL — HIGH (ref 70–99)
GLUCOSE SERPL-MCNC: 215 MG/DL — HIGH (ref 70–99)
HCT VFR BLD CALC: 28.9 % — LOW (ref 39–50)
HCT VFR BLD CALC: 34.5 % — LOW (ref 39–50)
HCT VFR BLD CALC: 35.7 % — LOW (ref 39–50)
HGB BLD-MCNC: 11.4 G/DL — LOW (ref 13–17)
HGB BLD-MCNC: 12 G/DL — LOW (ref 13–17)
HGB BLD-MCNC: 9.2 G/DL — LOW (ref 13–17)
INR BLD: 1.15 RATIO — SIGNIFICANT CHANGE UP (ref 0.88–1.16)
LACTATE SERPL-SCNC: 0.8 MMOL/L — SIGNIFICANT CHANGE UP (ref 0.5–2)
MAGNESIUM SERPL-MCNC: 1.4 MG/DL — LOW (ref 1.6–2.6)
MAGNESIUM SERPL-MCNC: 1.7 MG/DL — SIGNIFICANT CHANGE UP (ref 1.6–2.6)
MAGNESIUM SERPL-MCNC: 1.9 MG/DL — SIGNIFICANT CHANGE UP (ref 1.6–2.6)
MCHC RBC-ENTMCNC: 31.2 PG — SIGNIFICANT CHANGE UP (ref 27–34)
MCHC RBC-ENTMCNC: 31.3 PG — SIGNIFICANT CHANGE UP (ref 27–34)
MCHC RBC-ENTMCNC: 31.5 PG — SIGNIFICANT CHANGE UP (ref 27–34)
MCHC RBC-ENTMCNC: 31.8 GM/DL — LOW (ref 32–36)
MCHC RBC-ENTMCNC: 33 GM/DL — SIGNIFICANT CHANGE UP (ref 32–36)
MCHC RBC-ENTMCNC: 33.6 GM/DL — SIGNIFICANT CHANGE UP (ref 32–36)
MCV RBC AUTO: 93.2 FL — SIGNIFICANT CHANGE UP (ref 80–100)
MCV RBC AUTO: 95.3 FL — SIGNIFICANT CHANGE UP (ref 80–100)
MCV RBC AUTO: 98 FL — SIGNIFICANT CHANGE UP (ref 80–100)
NRBC # BLD: 0 /100 WBCS — SIGNIFICANT CHANGE UP
NRBC # FLD: 0 K/UL — SIGNIFICANT CHANGE UP
PHOSPHATE SERPL-MCNC: 2 MG/DL — LOW (ref 2.5–4.5)
PHOSPHATE SERPL-MCNC: 2.8 MG/DL — SIGNIFICANT CHANGE UP (ref 2.5–4.5)
PHOSPHATE SERPL-MCNC: 3.6 MG/DL — SIGNIFICANT CHANGE UP (ref 2.5–4.5)
PLATELET # BLD AUTO: 153 K/UL — SIGNIFICANT CHANGE UP (ref 150–400)
PLATELET # BLD AUTO: 196 K/UL — SIGNIFICANT CHANGE UP (ref 150–400)
PLATELET # BLD AUTO: 204 K/UL — SIGNIFICANT CHANGE UP (ref 150–400)
POTASSIUM SERPL-MCNC: 4.2 MMOL/L — SIGNIFICANT CHANGE UP (ref 3.5–5.3)
POTASSIUM SERPL-MCNC: 4.3 MMOL/L — SIGNIFICANT CHANGE UP (ref 3.5–5.3)
POTASSIUM SERPL-MCNC: 4.8 MMOL/L — SIGNIFICANT CHANGE UP (ref 3.5–5.3)
POTASSIUM SERPL-SCNC: 4.2 MMOL/L — SIGNIFICANT CHANGE UP (ref 3.5–5.3)
POTASSIUM SERPL-SCNC: 4.3 MMOL/L — SIGNIFICANT CHANGE UP (ref 3.5–5.3)
POTASSIUM SERPL-SCNC: 4.8 MMOL/L — SIGNIFICANT CHANGE UP (ref 3.5–5.3)
PROT SERPL-MCNC: 3.5 G/DL — LOW (ref 6–8.3)
PROT SERPL-MCNC: 5.4 G/DL — LOW (ref 6–8.3)
PROT SERPL-MCNC: 5.4 G/DL — LOW (ref 6–8.3)
PROTHROM AB SERPL-ACNC: 13.4 SEC — SIGNIFICANT CHANGE UP (ref 10.5–13.4)
RBC # BLD: 2.95 M/UL — LOW (ref 4.2–5.8)
RBC # BLD: 3.62 M/UL — LOW (ref 4.2–5.8)
RBC # BLD: 3.83 M/UL — LOW (ref 4.2–5.8)
RBC # FLD: 14.1 % — SIGNIFICANT CHANGE UP (ref 10.3–14.5)
RBC # FLD: 14.3 % — SIGNIFICANT CHANGE UP (ref 10.3–14.5)
RBC # FLD: 14.6 % — HIGH (ref 10.3–14.5)
SODIUM SERPL-SCNC: 131 MMOL/L — LOW (ref 135–145)
SODIUM SERPL-SCNC: 132 MMOL/L — LOW (ref 135–145)
SODIUM SERPL-SCNC: 134 MMOL/L — LOW (ref 135–145)
WBC # BLD: 11.42 K/UL — HIGH (ref 3.8–10.5)
WBC # BLD: 5.47 K/UL — SIGNIFICANT CHANGE UP (ref 3.8–10.5)
WBC # BLD: 7.05 K/UL — SIGNIFICANT CHANGE UP (ref 3.8–10.5)
WBC # FLD AUTO: 11.42 K/UL — HIGH (ref 3.8–10.5)
WBC # FLD AUTO: 5.47 K/UL — SIGNIFICANT CHANGE UP (ref 3.8–10.5)
WBC # FLD AUTO: 7.05 K/UL — SIGNIFICANT CHANGE UP (ref 3.8–10.5)

## 2022-07-28 PROCEDURE — 99232 SBSQ HOSP IP/OBS MODERATE 35: CPT | Mod: GC

## 2022-07-28 RX ORDER — FINASTERIDE 5 MG/1
5 TABLET, FILM COATED ORAL DAILY
Refills: 0 | Status: DISCONTINUED | OUTPATIENT
Start: 2022-07-28 | End: 2022-08-02

## 2022-07-28 RX ORDER — INSULIN LISPRO 100/ML
VIAL (ML) SUBCUTANEOUS EVERY 6 HOURS
Refills: 0 | Status: DISCONTINUED | OUTPATIENT
Start: 2022-07-28 | End: 2022-07-30

## 2022-07-28 RX ORDER — METOCLOPRAMIDE HCL 10 MG
10 TABLET ORAL ONCE
Refills: 0 | Status: COMPLETED | OUTPATIENT
Start: 2022-07-28 | End: 2022-07-28

## 2022-07-28 RX ORDER — SODIUM CHLORIDE 9 MG/ML
1000 INJECTION, SOLUTION INTRAVENOUS
Refills: 0 | Status: DISCONTINUED | OUTPATIENT
Start: 2022-07-28 | End: 2022-07-29

## 2022-07-28 RX ORDER — INSULIN LISPRO 100/ML
VIAL (ML) SUBCUTANEOUS
Refills: 0 | Status: DISCONTINUED | OUTPATIENT
Start: 2022-07-28 | End: 2022-07-28

## 2022-07-28 RX ORDER — SODIUM CHLORIDE 9 MG/ML
1000 INJECTION, SOLUTION INTRAVENOUS ONCE
Refills: 0 | Status: COMPLETED | OUTPATIENT
Start: 2022-07-28 | End: 2022-07-28

## 2022-07-28 RX ORDER — SODIUM CHLORIDE 9 MG/ML
500 INJECTION, SOLUTION INTRAVENOUS ONCE
Refills: 0 | Status: DISCONTINUED | OUTPATIENT
Start: 2022-07-28 | End: 2022-07-28

## 2022-07-28 RX ORDER — MAGNESIUM SULFATE 500 MG/ML
2 VIAL (ML) INJECTION ONCE
Refills: 0 | Status: COMPLETED | OUTPATIENT
Start: 2022-07-28 | End: 2022-07-28

## 2022-07-28 RX ADMIN — HYDROMORPHONE HYDROCHLORIDE 250 MILLILITER(S): 2 INJECTION INTRAMUSCULAR; INTRAVENOUS; SUBCUTANEOUS at 19:35

## 2022-07-28 RX ADMIN — Medication 400 MILLIGRAM(S): at 18:24

## 2022-07-28 RX ADMIN — HEPARIN SODIUM 5000 UNIT(S): 5000 INJECTION INTRAVENOUS; SUBCUTANEOUS at 06:19

## 2022-07-28 RX ADMIN — FINASTERIDE 5 MILLIGRAM(S): 5 TABLET, FILM COATED ORAL at 18:31

## 2022-07-28 RX ADMIN — HYDROMORPHONE HYDROCHLORIDE 250 MILLILITER(S): 2 INJECTION INTRAMUSCULAR; INTRAVENOUS; SUBCUTANEOUS at 22:41

## 2022-07-28 RX ADMIN — PANTOPRAZOLE SODIUM 40 MILLIGRAM(S): 20 TABLET, DELAYED RELEASE ORAL at 13:19

## 2022-07-28 RX ADMIN — HYDROMORPHONE HYDROCHLORIDE 250 MILLILITER(S): 2 INJECTION INTRAMUSCULAR; INTRAVENOUS; SUBCUTANEOUS at 07:05

## 2022-07-28 RX ADMIN — ONDANSETRON 4 MILLIGRAM(S): 8 TABLET, FILM COATED ORAL at 18:26

## 2022-07-28 RX ADMIN — Medication 1000 MILLIGRAM(S): at 12:50

## 2022-07-28 RX ADMIN — ONDANSETRON 4 MILLIGRAM(S): 8 TABLET, FILM COATED ORAL at 09:14

## 2022-07-28 RX ADMIN — Medication 1000 MILLIGRAM(S): at 06:32

## 2022-07-28 RX ADMIN — Medication 25 GRAM(S): at 02:59

## 2022-07-28 RX ADMIN — Medication 1000 MILLIGRAM(S): at 18:54

## 2022-07-28 RX ADMIN — Medication 400 MILLIGRAM(S): at 12:20

## 2022-07-28 RX ADMIN — Medication 400 MILLIGRAM(S): at 06:19

## 2022-07-28 RX ADMIN — SODIUM CHLORIDE 100 MILLILITER(S): 9 INJECTION, SOLUTION INTRAVENOUS at 07:45

## 2022-07-28 RX ADMIN — HEPARIN SODIUM 5000 UNIT(S): 5000 INJECTION INTRAVENOUS; SUBCUTANEOUS at 13:14

## 2022-07-28 RX ADMIN — HEPARIN SODIUM 5000 UNIT(S): 5000 INJECTION INTRAVENOUS; SUBCUTANEOUS at 22:18

## 2022-07-28 RX ADMIN — SODIUM CHLORIDE 1000 MILLILITER(S): 9 INJECTION, SOLUTION INTRAVENOUS at 15:00

## 2022-07-28 NOTE — OPERATIVE REPORT - OPERATIVE RPOSRT DETAILS
Title: Pancreatoduodenectomy, cholecystectomy, intraabdominal omental flap, placement of negative pressure wound therapy device    Anesthesia:  General endotracheal, epidural    Specimens:  Whipple, pancreatic neck margin, gallbladder    Prosthetic Device/Implants:  None    Operative Indications:  Lesion in head of pancreas     PROCEDURE:   After discussing risks, benefits, and alternatives in detail, the patient was consented for a whipple operation and brought to the operating room on the aforementioned date.  They had an epidural catheter placed, and were then placed in the supine position and underwent general anesthesia. Macario catheter and arterial line were place.  They were prepped and draped in the normal sterile manner with a chlorhexidine prep and a loban dressing over the skin.  A time-out was performed prior to any incision and the patient was given preoperative prophylactic antibiotics as well as subcutaneous heparin.      We made a midline incision from just below the xiphoid process to below the umbilicus.  We entered the abdomen under direct visualization, excised a large portion of the pre-peritoneal fat and then placed a Bookwalter retractor in position.  We thoroughly explored the abdomen and found no evidence of metastatic disease.  We performed a formal Cattell-Braasch maneuver and kocherized the duodenum to the left of the aorta.  We then enter the lesser sac along the greater curvature of the stomach and dissected down to the SMV.  We took down the gastrohepatic ligament, divided the right gastric artery between 2-0 silk ties, and then identified the GDA.   We test clamped the GDA to confirm there was no retrograde flow.  We then divided the GDA with a vascular load of the echelon powered stapler.  We then encircled the bile duct with a vessel loop.      At this point, I felt the lesion was indeed resectable, so we divide the antrum of the stomach with a black load of the EDUARDO stapler and reflected the proximal stomach to the left upper quadrant.  We then tunneled behind the neck of the pancreas without great difficulty, placed a penrose drain there, and subsequently divided the neck of the pancreas with electrocautery.  We then divide the bile duct with electrocautery.  We removed the gallbladder in the standard top down manner.  We divided the cystic artery and duct between 2-0 silk ties.    We then mobilized the pancreas off of the portal vein and divided the proximal jejunum with a purple load of the EDUARDO stapler.  We took the mesentery with the LigaSure device and then reflected the proximal jejunum through the ligament of Treitz to the right upper quadrant.  We took the remainder of the uncinate process with multiple Reinhoff clamps, 2-0 silk ties, as well as a LigaSure device and firings of the vascular stapler as appropriate.  There was good hemostasis and we remove the specimen from the field.  We then closed the mesenteric defect to the ligament of Treitz with a running 3-0 Prolene suture (interrupted 3-0 silk sutures).  We made a small hold in the bare area of the transverse mesocolon to the right of the middle colic vessels and bring the proximal jejunum to the right upper quadrant.      We oversewed the end of the jejunum with running 3-0 PDS sutures.     We performed an end-to-side pancreaticojejunostomy using 3-0 silk pop sutures as the anterior and posterior layers and using 5-0 PDS as the duct-to-mucosal layer.  We left a 5-Khmer pediatric feeding tube as a stent.  We then performed an end-to-side hepaticojejunostomy using 5-0 PDS suture in a standard interrupted manner.  We closed the mesenteric defect with 3-0 silk pop sutures.  We then performed an antecolic isoperistaltic gastrojejunostomy using running 3-0 PDS suture in a two-layer anastomosis.  We mobilized a large omental flap off of the transverse mesocolon and placed this on the PJ anastomosis and the GDA stump.  We confirmed the NG tube is in good position.  We then place two 19 Coleman drains in the standard position.  The more inferior drain was placed below the anastomosis and the more superior drain was placed above.      We then closed the abdominal fascia with a running #1 looped PDS suture.  We closed the dermis with 3-0 Vicryl suture and we affixed a negative-pressure wound therapy device to the incision.     The patient tolerated the procedure well.  The patient was extubated and then transferred to the ICU for overnight monitoring.

## 2022-07-28 NOTE — PROGRESS NOTE ADULT - ASSESSMENT
78 yo M recovering appropriately from ex-lap [place] on 7/27 for IPMN.     - PCEA in place  - Pain control PRN   - DVT PPX   - DC NGT   - Diet: sips and chips   - Macario in place   - Strict I's / O's   - Protonix 40mg QD   - OOB as tolerated  78 yo M recovering appropriately from ex-lap [place] on 7/27 for IPMN.     - PCEA in place  - Pain control PRN   - DVT PPX   - DC NGT   - Diet: limited clears (30cc/hr)   - Macario in place   - Strict I's / O's   - Protonix 40mg QD   - OOB as tolerated

## 2022-07-28 NOTE — PROGRESS NOTE ADULT - SUBJECTIVE AND OBJECTIVE BOX
Anesthesia Pain Management Service    SUBJECTIVE: Pt doing well with PCEA without problems reported.    Therapy:	  [ ] IV PCA	   [ X] Epidural           [ ] s/p Spinal Opoid              [ ] Postpartum infusion	  [ ] Patient controlled regional anesthesia (PCRA)    [ ] prn Analgesics    Allergies    No Known Allergies    Intolerances      MEDICATIONS  (STANDING):  acetaminophen   IVPB .. 1000 milliGRAM(s) IV Intermittent every 6 hours  dextrose 5% + sodium chloride 0.45% 1000 milliLiter(s) (84 mL/Hr) IV Continuous <Continuous>  heparin   Injectable 5000 Unit(s) SubCutaneous every 8 hours  hydromorphone (10 MICROgram(s)/mL) + bupivacaine 0.0625% in 0.9% Sodium Chloride PCEA 250 milliLiter(s) Epidural PCA Continuous  insulin lispro (ADMELOG) corrective regimen sliding scale   SubCutaneous three times a day before meals  metoclopramide Injectable 10 milliGRAM(s) IV Push once  pantoprazole  Injectable 40 milliGRAM(s) IV Push every 24 hours    MEDICATIONS  (PRN):  hydromorphone (10 MICROgram(s)/mL) + bupivacaine 0.0625% in 0.9% Sodium Chloride PCEA Rescue Clinician  Bolus 5 milliLiter(s) Epidural every 15 minutes PRN for Pain Score greater than 6  naloxone Injectable 0.1 milliGRAM(s) IV Push every 3 minutes PRN For ANY of the following changes in patient status:  A. RR LESS THAN 10 breaths per minute, B. Oxygen saturation LESS THAN 90%, C. Sedation score of 6  ondansetron Injectable 4 milliGRAM(s) IV Push every 6 hours PRN Nausea      OBJECTIVE:   [X] No new signs     [ ] Other:    Side Effects:  [X ] None			[ ] Other:    Assessment of Catheter Site:		[ X] Intact		[ ] Other:    ASSESSMENT/PLAN  [ X] Continue current therapy    [ ] Therapy changed to:    [ ] IV PCA       [ ] Epidural     [ ] prn Analgesics     Comments: Continue current PCEA settings.

## 2022-07-28 NOTE — CHART NOTE - NSCHARTNOTEFT_GEN_A_CORE
Patient eligible for floor bed, transferring to [phone]. Patient signed out to surgical resident.     SICU  [id number]

## 2022-07-28 NOTE — DIETITIAN INITIAL EVALUATION ADULT - PERTINENT LABORATORY DATA
07-28    134<L>  |  101  |  16  ----------------------------<  133<H>  4.2   |  20<L>  |  0.71    Ca    8.4      28 Jul 2022 00:49  Phos  3.6     07-28  Mg     1.70     07-28    TPro  5.4<L>  /  Alb  3.3  /  TBili  0.4  /  DBili  x   /  AST  43<H>  /  ALT  50<H>  /  AlkPhos  42  07-28  POCT Blood Glucose.: 135 mg/dL (07-28-22 @ 11:02)  A1C with Estimated Average Glucose Result: 6.6 % (07-19-22 @ 13:25)

## 2022-07-28 NOTE — DIETITIAN INITIAL EVALUATION ADULT - NSFNSGIIOFT_GEN_A_CORE
07-27-22 @ 07:01 - 07-28-22 @ 07:00  --------------------------------------------------------  OUT:    Nasogastric/Oral tube (mL): 400 mL  Total OUT: 400 mL    Total NET: -400 mL      07-28-22 @ 07:01 - 07-28-22 @ 11:54  --------------------------------------------------------  OUT:    Nasogastric/Oral tube (mL): 200 mL  Total OUT: 200 mL    Total NET: -200 mL

## 2022-07-28 NOTE — DIETITIAN INITIAL EVALUATION ADULT - NS FNS DIET ORDER
Diet, NPO:      Special Instructions for Nursing:  Except for LESS THAN 30 milliLiter(s) water per hour on Postoperative Day 1.  Limited clears up to 60 milliLiter(s) per hour on Postoperative Day 2 (07-28-22 @ 10:44)

## 2022-07-28 NOTE — PROGRESS NOTE ADULT - SUBJECTIVE AND OBJECTIVE BOX
Anesthesia Pain Management Service: Post Op Day _1_ of Epidural    SUBJECTIVE: Patient doing well with PCEA and no problems.   Pain Scale Score: 3/10  Refer to charted pain scores    THERAPY:  [x ] Epidural Bupivacaine 0.0625% and Hydromorphone  		[ X] 10 micrograms/mL	[ ] 5 micrograms/mL  [ ] Epidural Bupivacaine 0.0625% and Fentanyl - 2 micrograms/mL  [ ] Epidural Ropivacaine 0.1% plain – 1 mg/mL  [ ] Patient Controlled Regional Anesthesia (PCRA) Ropivacaine  		[ ] 0.2%			[ ] 0.1%    Demand dose __3_ lockout __15_ (minutes) Continuous Rate __4_ Total: __86.5__ ml used (in past 24 hours)      MEDICATIONS  (STANDING):  acetaminophen   IVPB .. 1000 milliGRAM(s) IV Intermittent every 6 hours  dextrose 5% + sodium chloride 0.45% 1000 milliLiter(s) (84 mL/Hr) IV Continuous <Continuous>  heparin   Injectable 5000 Unit(s) SubCutaneous every 8 hours  hydromorphone (10 MICROgram(s)/mL) + bupivacaine 0.0625% in 0.9% Sodium Chloride PCEA 250 milliLiter(s) Epidural PCA Continuous  insulin lispro (ADMELOG) corrective regimen sliding scale   SubCutaneous three times a day before meals  metoclopramide Injectable 10 milliGRAM(s) IV Push once  pantoprazole  Injectable 40 milliGRAM(s) IV Push every 24 hours    MEDICATIONS  (PRN):  hydromorphone (10 MICROgram(s)/mL) + bupivacaine 0.0625% in 0.9% Sodium Chloride PCEA Rescue Clinician  Bolus 5 milliLiter(s) Epidural every 15 minutes PRN for Pain Score greater than 6  naloxone Injectable 0.1 milliGRAM(s) IV Push every 3 minutes PRN For ANY of the following changes in patient status:  A. RR LESS THAN 10 breaths per minute, B. Oxygen saturation LESS THAN 90%, C. Sedation score of 6  ondansetron Injectable 4 milliGRAM(s) IV Push every 6 hours PRN Nausea      OBJECTIVE: Patient sitting up in chair, NGT in place.    Assessment of Catheter Site:	[ ] Left	[ ] Right  [x ] Epidural 	[ ] Femoral	      [ ] Saphenous   [ ] Supraclavicular   [ ] Other:    [x ] Dressing intact	[x ] Site non-tender	[ x] Site without erythema, discharge, edema  [x ] Epidural tubing and connection checked	[x] Gross neurological exam within normal limits  [ ] Catheter removed – tip intact		[ ] Afebrile  	[ ] Febrile: ___   [ X] see Temp under VS below)    PT/INR - ( 28 Jul 2022 00:49 )   PT: 13.4 sec;   INR: 1.15 ratio         PTT - ( 28 Jul 2022 00:49 )  PTT:19.8 sec                      12.0   11.42 )-----------( 204      ( 28 Jul 2022 00:49 )             35.7     Vital Signs Last 24 Hrs  T(C): 36.7 (07-28-22 @ 08:00), Max: 37.1 (07-27-22 @ 16:00)  T(F): 98 (07-28-22 @ 08:00), Max: 98.7 (07-27-22 @ 16:00)  HR: 82 (07-28-22 @ 09:00) (68 - 99)  BP: 117/68 (07-28-22 @ 09:00) (106/67 - 154/75)  BP(mean): 83 (07-28-22 @ 09:00) (80 - 97)  RR: 17 (07-28-22 @ 09:00) (10 - 22)  SpO2: 97% (07-28-22 @ 09:00) (96% - 100%)      Sedation Score:	[x ] Alert	[ ] Drowsy	[ ] Arousable	[ ] Asleep	[ ] Unresponsive    Side Effects:	[x ] None	[ ] Nausea	[ ] Vomiting	[ ] Pruritus  		[ ] Weakness		[ ] Numbness	[ ] Other:    ASSESSMENT/ PLAN:    Therapy to  be:	[x ] Continue   [ ] Discontinued   [ ] Change to prn Analgesics    Documentation and Verification of current medications:  [ X ] Done	[ ] Not done, not eligible, reason:    Comments: Continue PCEA.    Progress Note written now but Patient was seen earlier.

## 2022-07-28 NOTE — OPERATIVE REPORT - NSPANCSURGVACPLACED_GEN_ALL_CORE_SD
Yes Mucosal Advancement Flap Text: Given the location of the defect, shape of the defect and the proximity to free margins a mucosal advancement flap was deemed most appropriate. Incisions were made with a 15 blade scalpel in the appropriate fashion along the cutaneous vermilion border and the mucosal lip. The remaining actinically damaged mucosal tissue was excised.  The mucosal advancement flap was then elevated to the gingival sulcus with care taken to preserve the neurovascular structures and advanced into the primary defect. Care was taken to ensure that precise realignment of the vermilion border was achieved.

## 2022-07-28 NOTE — PROGRESS NOTE ADULT - SUBJECTIVE AND OBJECTIVE BOX
POST ANESTHESIA EVALUATION    77y Male POSTOP DAY 1 S/P     MENTAL STATUS: Patient participation [x  ] Awake     [  ] Arousable     [  ] Sedated    AIRWAY PATENCY: [x  ] Satisfactory  [  ] Other:     Vital Signs Last 24 Hrs  T(C): 36.7 (28 Jul 2022 08:00), Max: 37.1 (27 Jul 2022 16:00)  T(F): 98 (28 Jul 2022 08:00), Max: 98.7 (27 Jul 2022 16:00)  HR: 77 (28 Jul 2022 11:00) (68 - 99)  BP: 109/57 (28 Jul 2022 11:00) (106/67 - 154/75)  BP(mean): 73 (28 Jul 2022 11:00) (70 - 97)  RR: 19 (28 Jul 2022 11:00) (10 - 22)  SpO2: 100% (28 Jul 2022 11:00) (96% - 100%)    Parameters below as of 28 Jul 2022 11:00  Patient On (Oxygen Delivery Method): nasal cannula  O2 Flow (L/min): 2    I&O's Summary    27 Jul 2022 07:01  -  28 Jul 2022 07:00  --------------------------------------------------------  IN: 2150 mL / OUT: 2684 mL / NET: -534 mL    28 Jul 2022 07:01  -  28 Jul 2022 12:15  --------------------------------------------------------  IN: 436 mL / OUT: 468 mL / NET: -32 mL          NAUSEA/ VOMITTING:  [ x ] NONE  [  ] CONTROLLED [  ] OTHER     PAIN: [ x ] CONTROLLED WITH CURRENT REGIMEN  [  ] OTHER    [x  ] NO APPARENT ANESTHESIA COMPLICATIONS      Comments:

## 2022-07-28 NOTE — PROGRESS NOTE ADULT - SUBJECTIVE AND OBJECTIVE BOX
HISTORY  77y Male    24 HOUR EVENTS:  -s/p Whipple for IPMN    PHYSICAL EXAM    NEUROLOGY  Exam: Normal, NAD, alert, oriented x 3, no focal deficits.    HEENT  Exam: Normocephalic, atraumatic. EOMI.    RESPIRATORY  Exam: Lungs clear to auscultation, Normal expansion/effort.    CARDIOVASCULAR  Exam: S1, S2.  Regular rate and rhythm.  Warm, well perfused.     GI/NUTRITION  Exam: Abdomen soft, Non-tender, Non-distended.  PREVENA in place over midline wound. HAMI drains x2 serosanguinous, holding suction   Current Diet: NPO    VASCULAR  Exam: Extremities warm, pink, well-perfused.     MUSCULOSKELETAL  Exam: All extremities moving spontaneously without limitations.    SKIN:  Exam: Good skin turgor, no skin breakdown.      NEURO  Meds:    RESPIRATORY  Mechanical Ventilation: HR: 79 (07-28-22 @ 00:00) (79 - 99)  BP: 144/73 (07-28-22 @ 00:00) (133/74 - 154/75)  BP(mean): 94 (07-28-22 @ 00:00) (94 - 97)  ABP: 138/80 (07-28-22 @ 00:00) (128/58 - 156/72)  ABP(mean): 84 (07-28-22 @ 00:00) (83 - 106)  Wt(kg): --  CVP(cm H2O): --  ABG - ( 27 Jul 2022 12:30 )  pH: 7.39  /  pCO2: 34    /  pO2: 153   / HCO3: 21    / Base Excess: -3.7  /  SaO2: 99.4    Lactate: x            Meds:    CARDIOVASCULAR    Cardiac Rhythm:  Meds:    GI/NUTRITION  Diet:  Meds:pantoprazole  Injectable 40 milliGRAM(s) IV Push every 24 hours      GENITOURINARY/RENAL  Meds:  07-27 @ 07:01  -  07-28 @ 00:37  --------------------------------------------------------  IN:    IV PiggyBack: 350 mL    Lactated Ringers: 600 mL  Total IN: 950 mL    OUT:    Bulb (mL): 180 mL    Bulb (mL): 130 mL    Indwelling Catheter - Urethral (mL): 1363 mL  Total OUT: 1673 mL    Total NET: -723 mL        Weight (kg): 72.6 (07-27 @ 06:31)  lactated ringers. 1000 milliLiter(s) IV Continuous <Continuous>    07-27    135  |  99  |  18  ----------------------------<  188<H>  3.8   |  17<L>  |  0.65    Ca    8.3<L>      27 Jul 2022 14:00  Phos  3.4     07-27  Mg     1.20     07-27    TPro  5.8<L>  /  Alb  3.8  /  TBili  0.5  /  DBili  x   /  AST  61<H>  /  ALT  61<H>  /  AlkPhos  48  07-27    [X] Macario catheter, indication: urine output monitoring in critically ill patient    HEMATOLOGIC  Meds: heparin   Injectable 5000 Unit(s) SubCutaneous every 8 hours    [x] VTE Prophylaxis                        12.4   10.76 )-----------( 170      ( 27 Jul 2022 14:00 )             38.3     PT/INR - ( 27 Jul 2022 14:00 )   PT: 14.3 sec;   INR: 1.23 ratio         PTT - ( 27 Jul 2022 14:00 )  PTT:26.4 sec  Transfusion     [ ] PRBC   [ ] Platelets   [ ] FFP   [ ] Cryoprecipitate    INFECTIOUS DISEASES  T(C): 36.8 (07-27-22 @ 20:00), Max: 37.1 (07-27-22 @ 16:00)  Wt(kg): --  WBC Count: 10.76 K/uL (07-27 @ 14:00)    Recent Cultures:    Meds:     ENDOCRINE  Capillary Blood Glucose    Meds:     Tubes/Lines/Drains  [x] Peripheral IV  [] Central Venous Line     	[] R	[] L	[] IJ	[] Fem	[] SC	Date Placed:   [x] Arterial Line		            [] R	[x] L	[] Fem	[x] Rad	[] Ax	Date Placed: 7/27/2022  [] PICC:         	  [] Midline		  [] Mediport  [] Urinary Catheter		Date Placed: 7/27/2022  OTHER MEDICATIONS:  naloxone Injectable 0.1 milliGRAM(s) IV Push every 3 minutes PRN      CODE STATUS:     IMAGING:

## 2022-07-28 NOTE — DIETITIAN INITIAL EVALUATION ADULT - ADD RECOMMEND
1. Diet advancement and education as per [place] Protocol. 2. Monitor weights, labs, BM's, skin integrity, p.o. intake/tolerance and edema. 3. Follow pt as per protocol.

## 2022-07-28 NOTE — PROGRESS NOTE ADULT - SUBJECTIVE AND OBJECTIVE BOX
TEAM Surgery Progress Note  Patient is a 77y old  Male who presents with a chief complaint of "Pancreatic surgery " (19 Jul 2022 13:10)      INTERVAL EVENTS: Patient is POD#1. No acute events overnight.  SUBJECTIVE: Patient seen and examined at bedside with surgical team, patient without complaints. Denies fever, chills, CP, SOB nausea, vomiting. Bowel Function -/-. Pt states pain is well controlled.     REVIEW OF SYSTEMS:  Constitutional: No fevers or chills. No malaise or weakness.  EENT: No vision changes. No ear pain. No nasal congestion or rhinitis. No throat pain or dysphagia.  Respiratory: No cough, wheezing, or SOB. No hemoptysis.  Cardiovascular: No chest pain or palpitations.  Gastrointestinal: No abdominal pain. No nausea, vomiting, diarrhea or constipation. No hematochezia. No melena.  Genitourinary: No dysuria, hematuria, or frequency.  Neurologic: No numbness or tingling. No weakness.  Skin: No rashes or pruritus.     OBJECTIVE:    Vital Signs Last 24 Hrs  T(C): 36.7 (28 Jul 2022 08:00), Max: 37.1 (27 Jul 2022 16:00)  T(F): 98 (28 Jul 2022 08:00), Max: 98.7 (27 Jul 2022 16:00)  HR: 77 (28 Jul 2022 08:00) (68 - 99)  BP: 106/67 (28 Jul 2022 08:00) (106/67 - 154/75)  BP(mean): 80 (28 Jul 2022 08:00) (80 - 97)  RR: 16 (28 Jul 2022 08:00) (10 - 22)  SpO2: 98% (28 Jul 2022 08:00) (96% - 100%)    Parameters below as of 28 Jul 2022 08:00  Patient On (Oxygen Delivery Method): nasal cannula  O2 Flow (L/min): 2  I&O's Detail    27 Jul 2022 07:01  -  28 Jul 2022 07:00  --------------------------------------------------------  IN:    IV PiggyBack: 350 mL    Lactated Ringers: 1800 mL  Total IN: 2150 mL    OUT:    Bulb (mL): 180 mL    Bulb (mL): 130 mL    Indwelling Catheter - Urethral (mL): 1974 mL    Nasogastric/Oral tube (mL): 400 mL  Total OUT: 2684 mL    Total NET: -534 mL      28 Jul 2022 07:01  -  28 Jul 2022 08:20  --------------------------------------------------------  IN:    Lactated Ringers: 100 mL  Total IN: 100 mL    OUT:    Bulb (mL): 30 mL    Bulb (mL): 10 mL    Drain (mL): 0 mL    Indwelling Catheter - Urethral (mL): 30 mL    Nasogastric/Oral tube (mL): 200 mL  Total OUT: 270 mL    Total NET: -170 mL    PHYSICAL EXAM:  Constitutional: A&Ox3, NAD. NGT in place.   Respiratory: Unlabored breathing  Abdomen: Soft, nondistended, NTTP. No rebound or guarding. Prevena in place. 2 JPs on R. side serosanguinous output.   : Macario in place draining clear urine.   Extremities: WWP, HAYNES spontaneously    LABS:                        12.0   11.42 )-----------( 204      ( 28 Jul 2022 00:49 )             35.7     07-28    134<L>  |  101  |  16  ----------------------------<  133<H>  4.2   |  20<L>  |  0.71    Ca    8.4      28 Jul 2022 00:49  Phos  3.6     07-28  Mg     1.70     07-28    TPro  5.4<L>  /  Alb  3.3  /  TBili  0.4  /  DBili  x   /  AST  43<H>  /  ALT  50<H>  /  AlkPhos  42  07-28    PT/INR - ( 28 Jul 2022 00:49 )   PT: 13.4 sec;   INR: 1.15 ratio         PTT - ( 28 Jul 2022 00:49 )  PTT:19.8 sec  LIVER FUNCTIONS - ( 28 Jul 2022 00:49 )  Alb: 3.3 g/dL / Pro: 5.4 g/dL / ALK PHOS: 42 U/L / ALT: 50 U/L / AST: 43 U/L / GGT: x

## 2022-07-28 NOTE — DIETITIAN INITIAL EVALUATION ADULT - OTHER INFO
75 y/o male w/PMH significant for HTN, T2DM, BPH w/hematuria, eczema, and pancreatic ductal dilation, now s/p Whipple for IPMN (7/27). Visited with pt to obtain nutrition hx. Pt now initiated on 30 mL water, every hour, and being tolerated as per patient. Pt said he had been eating well PTA with stable wt. He denies food allergies, nausea/vomiting/diarrhea/constipation, or issues with chewing/swallowing; hypoactive bowel sounds at present. Receiving IVF of D5W + NS 0.45% with IVPB fluids. Explained to patient post Whipple protocol guidelines. Discussed how diet would be advanced in specific increments with Day 2 of 60 mL of water q 1 hr and then diet advancement to Clear Liquids. Offered recommendation to try to obtain sugar-free oral nutrition supplement such as Premier Protein Clear as an option to consume with and between meals. Explained that pt now had a higher protein need due to this surgical procedure and that a supplement such as this would help him meet this higher need. Pt said he would have his wife try to obtain this for him. Nutrition education as per Whipple protocol guidelines. RDN services to remain available as needed.

## 2022-07-28 NOTE — OPERATIVE REPORT - NSICDXBRIEFPROCEDURE_GEN_ALL_CORE_FT
PROCEDURES:  Pancreatectomy, Whipple type, with pancreatojejunostomy 27-Jul-2022 13:41:22  Miguel Terrazas  Creation, omental flap, intra-abdominal 28-Jul-2022 08:28:25  Prasad Ahn  Negative pressure wound therapy, 50 sq cm or less 28-Jul-2022 08:28:37  Prasad Ahn

## 2022-07-28 NOTE — PROGRESS NOTE ADULT - ASSESSMENT
77M w/PMH significant for HTN, T2DM, BPH w/hematuria, eczema, and pancreatic ductal dilation, now s/p Whipple for IPMN (7/27). SICU consulted for hemodynamic monitoring in the post-operative period.    PLAN:   Neurologic:   - Pain control with PCEA, acetaminophen IV ATC, q6h x2 doses     Respiratory:   - Monitor SaO2     Cardiovascular:   - Maintain MAP >65    Gastrointestinal/Nutrition:   - NPO/NGT  - Protonix qd     Renal/Genitourinary:   - LR @ 100cc/hr  - Monitor electrolytes, replete PRN   - Strict Is/Os    Hematologic:   - VTE ppx with SQH, SCDs bilaterally     Infectious Disease:   - No active issues, monitor WBC    Endocrine:   - ISS, monitor FS    Tubes/Lines/Drains:   - NGT  - Coleman drain x2; inferior posterior, superior anterior  - Macario  - L radial a line   - PIV     Disposition:  - SICU    77M w/PMH significant for HTN, T2DM, BPH w/hematuria, eczema, and pancreatic ductal dilation, now s/p Whipple for IPMN (7/27). SICU consulted for hemodynamic monitoring in the post-operative period.    PLAN:   Neurologic:   - Pain control with PCEA, acetaminophen IV q6h standing    Respiratory:   - Monitor SaO2     Cardiovascular:   - Maintain MAP >65    Gastrointestinal/Nutrition:   - NPO  - d/c NGT  - Protonix qd     Renal/Genitourinary:   - D5+1/2NS @ 84cc/hr  - Monitor electrolytes, replete PRN   - Strict Is/Os    Hematologic:   - VTE ppx with SQH, SCDs bilaterally     Infectious Disease:   - No active issues, monitor WBC    Endocrine:   - ISS, monitor FS    Tubes/Lines/Drains:   - d/c NGT  - Coleman drain x2; inferior posterior, superior anterior  - Macario  - L radial a line   - PIV     Disposition:  - SICU, ok for floor pending discussion with primary team

## 2022-07-28 NOTE — DIETITIAN INITIAL EVALUATION ADULT - PERTINENT MEDS FT
MEDICATIONS  (STANDING):  acetaminophen   IVPB .. 1000 milliGRAM(s) IV Intermittent every 6 hours  dextrose 5% + sodium chloride 0.45% 1000 milliLiter(s) (84 mL/Hr) IV Continuous <Continuous>  heparin   Injectable 5000 Unit(s) SubCutaneous every 8 hours  hydromorphone (10 MICROgram(s)/mL) + bupivacaine 0.0625% in 0.9% Sodium Chloride PCEA 250 milliLiter(s) Epidural PCA Continuous  insulin lispro (ADMELOG) corrective regimen sliding scale   SubCutaneous three times a day before meals  metoclopramide Injectable 10 milliGRAM(s) IV Push once  pantoprazole  Injectable 40 milliGRAM(s) IV Push every 24 hours    MEDICATIONS  (PRN):  hydromorphone (10 MICROgram(s)/mL) + bupivacaine 0.0625% in 0.9% Sodium Chloride PCEA Rescue Clinician  Bolus 5 milliLiter(s) Epidural every 15 minutes PRN for Pain Score greater than 6  naloxone Injectable 0.1 milliGRAM(s) IV Push every 3 minutes PRN For ANY of the following changes in patient status:  A. RR LESS THAN 10 breaths per minute, B. Oxygen saturation LESS THAN 90%, C. Sedation score of 6  ondansetron Injectable 4 milliGRAM(s) IV Push every 6 hours PRN Nausea

## 2022-07-29 ENCOUNTER — TRANSCRIPTION ENCOUNTER (OUTPATIENT)
Age: 77
End: 2022-07-29

## 2022-07-29 LAB
ALBUMIN SERPL ELPH-MCNC: 3.4 G/DL — SIGNIFICANT CHANGE UP (ref 3.3–5)
ALP SERPL-CCNC: 47 U/L — SIGNIFICANT CHANGE UP (ref 40–120)
ALT FLD-CCNC: 37 U/L — SIGNIFICANT CHANGE UP (ref 4–41)
AMYLASE FLD-CCNC: 21 U/L — SIGNIFICANT CHANGE UP
AMYLASE FLD-CCNC: 22 U/L — SIGNIFICANT CHANGE UP
ANION GAP SERPL CALC-SCNC: 6 MMOL/L — LOW (ref 7–14)
ANION GAP SERPL CALC-SCNC: 9 MMOL/L — SIGNIFICANT CHANGE UP (ref 7–14)
APTT BLD: 26.4 SEC — LOW (ref 27–36.3)
AST SERPL-CCNC: 29 U/L — SIGNIFICANT CHANGE UP (ref 4–40)
BILIRUB SERPL-MCNC: 0.4 MG/DL — SIGNIFICANT CHANGE UP (ref 0.2–1.2)
BUN SERPL-MCNC: 14 MG/DL — SIGNIFICANT CHANGE UP (ref 7–23)
BUN SERPL-MCNC: 14 MG/DL — SIGNIFICANT CHANGE UP (ref 7–23)
CALCIUM SERPL-MCNC: 8.4 MG/DL — SIGNIFICANT CHANGE UP (ref 8.4–10.5)
CALCIUM SERPL-MCNC: 8.4 MG/DL — SIGNIFICANT CHANGE UP (ref 8.4–10.5)
CHLORIDE SERPL-SCNC: 101 MMOL/L — SIGNIFICANT CHANGE UP (ref 98–107)
CHLORIDE SERPL-SCNC: 102 MMOL/L — SIGNIFICANT CHANGE UP (ref 98–107)
CO2 SERPL-SCNC: 24 MMOL/L — SIGNIFICANT CHANGE UP (ref 22–31)
CO2 SERPL-SCNC: 26 MMOL/L — SIGNIFICANT CHANGE UP (ref 22–31)
CREAT SERPL-MCNC: 0.77 MG/DL — SIGNIFICANT CHANGE UP (ref 0.5–1.3)
CREAT SERPL-MCNC: 0.84 MG/DL — SIGNIFICANT CHANGE UP (ref 0.5–1.3)
EGFR: 90 ML/MIN/1.73M2 — SIGNIFICANT CHANGE UP
EGFR: 93 ML/MIN/1.73M2 — SIGNIFICANT CHANGE UP
GLUCOSE BLDC GLUCOMTR-MCNC: 105 MG/DL — HIGH (ref 70–99)
GLUCOSE BLDC GLUCOMTR-MCNC: 117 MG/DL — HIGH (ref 70–99)
GLUCOSE BLDC GLUCOMTR-MCNC: 134 MG/DL — HIGH (ref 70–99)
GLUCOSE BLDC GLUCOMTR-MCNC: 143 MG/DL — HIGH (ref 70–99)
GLUCOSE SERPL-MCNC: 142 MG/DL — HIGH (ref 70–99)
GLUCOSE SERPL-MCNC: 150 MG/DL — HIGH (ref 70–99)
HCT VFR BLD CALC: 36.9 % — LOW (ref 39–50)
HGB BLD-MCNC: 12 G/DL — LOW (ref 13–17)
INR BLD: 1.17 RATIO — HIGH (ref 0.88–1.16)
MAGNESIUM SERPL-MCNC: 2 MG/DL — SIGNIFICANT CHANGE UP (ref 1.6–2.6)
MAGNESIUM SERPL-MCNC: 2.1 MG/DL — SIGNIFICANT CHANGE UP (ref 1.6–2.6)
MCHC RBC-ENTMCNC: 30.9 PG — SIGNIFICANT CHANGE UP (ref 27–34)
MCHC RBC-ENTMCNC: 32.5 GM/DL — SIGNIFICANT CHANGE UP (ref 32–36)
MCV RBC AUTO: 95.1 FL — SIGNIFICANT CHANGE UP (ref 80–100)
NRBC # BLD: 0 /100 WBCS — SIGNIFICANT CHANGE UP
NRBC # FLD: 0 K/UL — SIGNIFICANT CHANGE UP
PHOSPHATE SERPL-MCNC: 2.3 MG/DL — LOW (ref 2.5–4.5)
PHOSPHATE SERPL-MCNC: 2.9 MG/DL — SIGNIFICANT CHANGE UP (ref 2.5–4.5)
PLATELET # BLD AUTO: 196 K/UL — SIGNIFICANT CHANGE UP (ref 150–400)
POTASSIUM SERPL-MCNC: 4.6 MMOL/L — SIGNIFICANT CHANGE UP (ref 3.5–5.3)
POTASSIUM SERPL-MCNC: 5.3 MMOL/L — SIGNIFICANT CHANGE UP (ref 3.5–5.3)
POTASSIUM SERPL-SCNC: 4.6 MMOL/L — SIGNIFICANT CHANGE UP (ref 3.5–5.3)
POTASSIUM SERPL-SCNC: 5.3 MMOL/L — SIGNIFICANT CHANGE UP (ref 3.5–5.3)
PROT SERPL-MCNC: 5.5 G/DL — LOW (ref 6–8.3)
PROTHROM AB SERPL-ACNC: 13.6 SEC — HIGH (ref 10.5–13.4)
RBC # BLD: 3.88 M/UL — LOW (ref 4.2–5.8)
RBC # FLD: 14.6 % — HIGH (ref 10.3–14.5)
SODIUM SERPL-SCNC: 134 MMOL/L — LOW (ref 135–145)
SODIUM SERPL-SCNC: 134 MMOL/L — LOW (ref 135–145)
WBC # BLD: 6.55 K/UL — SIGNIFICANT CHANGE UP (ref 3.8–10.5)
WBC # FLD AUTO: 6.55 K/UL — SIGNIFICANT CHANGE UP (ref 3.8–10.5)

## 2022-07-29 RX ORDER — TAMSULOSIN HYDROCHLORIDE 0.4 MG/1
0.4 CAPSULE ORAL AT BEDTIME
Refills: 0 | Status: DISCONTINUED | OUTPATIENT
Start: 2022-07-29 | End: 2022-08-02

## 2022-07-29 RX ORDER — SODIUM CHLORIDE 9 MG/ML
1000 INJECTION, SOLUTION INTRAVENOUS
Refills: 0 | Status: DISCONTINUED | OUTPATIENT
Start: 2022-07-29 | End: 2022-07-31

## 2022-07-29 RX ORDER — ACETAMINOPHEN 500 MG
1000 TABLET ORAL EVERY 6 HOURS
Refills: 0 | Status: COMPLETED | OUTPATIENT
Start: 2022-07-29 | End: 2022-07-30

## 2022-07-29 RX ORDER — SODIUM CHLORIDE 9 MG/ML
500 INJECTION, SOLUTION INTRAVENOUS ONCE
Refills: 0 | Status: COMPLETED | OUTPATIENT
Start: 2022-07-29 | End: 2022-07-29

## 2022-07-29 RX ADMIN — Medication 1000 MILLIGRAM(S): at 18:15

## 2022-07-29 RX ADMIN — HYDROMORPHONE HYDROCHLORIDE 250 MILLILITER(S): 2 INJECTION INTRAMUSCULAR; INTRAVENOUS; SUBCUTANEOUS at 12:49

## 2022-07-29 RX ADMIN — SODIUM CHLORIDE 1000 MILLILITER(S): 9 INJECTION, SOLUTION INTRAVENOUS at 17:43

## 2022-07-29 RX ADMIN — HEPARIN SODIUM 5000 UNIT(S): 5000 INJECTION INTRAVENOUS; SUBCUTANEOUS at 05:50

## 2022-07-29 RX ADMIN — FINASTERIDE 5 MILLIGRAM(S): 5 TABLET, FILM COATED ORAL at 13:05

## 2022-07-29 RX ADMIN — Medication 400 MILLIGRAM(S): at 13:03

## 2022-07-29 RX ADMIN — HYDROMORPHONE HYDROCHLORIDE 250 MILLILITER(S): 2 INJECTION INTRAMUSCULAR; INTRAVENOUS; SUBCUTANEOUS at 07:40

## 2022-07-29 RX ADMIN — HYDROMORPHONE HYDROCHLORIDE 250 MILLILITER(S): 2 INJECTION INTRAMUSCULAR; INTRAVENOUS; SUBCUTANEOUS at 19:35

## 2022-07-29 RX ADMIN — PANTOPRAZOLE SODIUM 40 MILLIGRAM(S): 20 TABLET, DELAYED RELEASE ORAL at 16:44

## 2022-07-29 RX ADMIN — HEPARIN SODIUM 5000 UNIT(S): 5000 INJECTION INTRAVENOUS; SUBCUTANEOUS at 13:04

## 2022-07-29 RX ADMIN — Medication 400 MILLIGRAM(S): at 17:43

## 2022-07-29 NOTE — PROVIDER CONTACT NOTE (FALL NOTIFICATION) - ACTION/TREATMENT ORDERED:
Team + ANM aware and at bedside. Pt. denies pain/SOB/palipations. Pt. safely in bed, NAD noted. Bed alarm activated. Yellow band on/red socks given. Education provided. Will continue to monitor.

## 2022-07-29 NOTE — PROGRESS NOTE ADULT - SUBJECTIVE AND OBJECTIVE BOX
Anesthesia Pain Management Service- Attending Addendum    SUBJECTIVE: Pt doing well with PCEA without problems reported. Nurse and patient report some dizziness upon standing.     Therapy:	  [ ] IV PCA	   [ X] Epidural           [ ] s/p Spinal Opoid              [ ] Postpartum infusion	  [ ] Patient controlled regional anesthesia (PCRA)    [ ] prn Analgesics    Allergies    No Known Allergies    Intolerances      MEDICATIONS  (STANDING):  acetaminophen   IVPB .. 1000 milliGRAM(s) IV Intermittent every 6 hours  dextrose 5% + sodium chloride 0.45% 1000 milliLiter(s) (42 mL/Hr) IV Continuous <Continuous>  finasteride 5 milliGRAM(s) Oral daily  heparin   Injectable 5000 Unit(s) SubCutaneous every 8 hours  hydromorphone (10 MICROgram(s)/mL) + bupivacaine 0.0625% in 0.9% Sodium Chloride PCEA 250 milliLiter(s) Epidural PCA Continuous  insulin lispro (ADMELOG) corrective regimen sliding scale   SubCutaneous every 6 hours  pantoprazole  Injectable 40 milliGRAM(s) IV Push every 24 hours  tamsulosin 0.4 milliGRAM(s) Oral at bedtime    MEDICATIONS  (PRN):  hydromorphone (10 MICROgram(s)/mL) + bupivacaine 0.0625% in 0.9% Sodium Chloride PCEA Rescue Clinician  Bolus 5 milliLiter(s) Epidural every 15 minutes PRN for Pain Score greater than 6  naloxone Injectable 0.1 milliGRAM(s) IV Push every 3 minutes PRN For ANY of the following changes in patient status:  A. RR LESS THAN 10 breaths per minute, B. Oxygen saturation LESS THAN 90%, C. Sedation score of 6  ondansetron Injectable 4 milliGRAM(s) IV Push every 6 hours PRN Nausea      OBJECTIVE:   [X] No new signs     [ ] Other: Bilateral t5-t9 coverage to cold test     Side Effects:  [X ] None			[ ] Other:    Assessment of Catheter Site:		[ X] Intact		[ ] Other:    ASSESSMENT/PLAN  [ X] Continue current therapy. Would recommend adequate PO intake and assistance with standing until the patient is feeling more secure. Likely he is having orthostasis from the fluid shifts during surgery.     [ ] Therapy changed to:    [ ] IV PCA       [ ] Epidural     [ ] prn Analgesics     Comments: Continue current PCEA settings.    Note written after patient seen

## 2022-07-29 NOTE — PROVIDER CONTACT NOTE (FALL NOTIFICATION) - SITUATION
Pt. states "I was trying to void in the urinal while sitting on my recliner and as I was inching up, I slipped and fell on my buttocks.

## 2022-07-29 NOTE — CHART NOTE - NSCHARTNOTEFT_GEN_A_CORE
Patient seen and evaluated at bedside after transfer from SICU to floor bed in 8 tower. Patient doing well but pain was endorsed as not controlled well. However, patient was unaware he could press his PCA button more than once an hour. He otherwise was doing well and happy to be in a quieter room. Denies fevers, chills, chest pain, dyspnea, dizziness.      Exam:  Abd: soft, mildly distended, preveena vac to suction        Plan:     78 yo M recovering appropriately from ex-lap whipple on 7/27 for IPMN.     - PCEA in place  - Pain control PRN   - DVT PPX   - Diet: limited clears (30cc/hr)   - Macario in place   - Strict I's / O's   - Protonix 40mg QD   - OOB as tolerated Patient seen and evaluated at bedside after transfer from SICU to floor bed in 8 tower. Patient doing well but pain was endorsed as not controlled well. However, patient was unaware he could press his PCA button more than once an hour. He otherwise was doing well and happy to be in a quieter room. Denies fevers, chills, chest pain, dyspnea, dizziness.      Exam:  Abd: soft, appropriately tender, mildly distended, preveena vac to suction, R HAIM drain w/ 5ml serosanguinous drainage        Plan:     76 yo M recovering appropriately from ex-lap whipple on 7/27 for IPMN.     - PCEA in place  - Pain control PRN   - DVT PPX   - Diet: limited clears (30cc/hr)   - Macario in place   - Strict I's / O's   - Protonix 40mg QD   - OOB as tolerated

## 2022-07-29 NOTE — PROGRESS NOTE ADULT - ASSESSMENT
76 yo M recovering appropriately from ex-lap [place] on 7/27 for IPMN.     - PCEA in place  - Pain control PRN   - DVT PPX   - DC NGT   - Diet: limited clears (30cc/hr)   - Macario in place   - Strict I's / O's   - Protonix 40mg QD   - OOB as tolerated  78 yo M recovering appropriately from ex-lap whipple on 7/27 for IPMN.     Plan  - Whipple pathway POD2  - Diet: clear liquid diet < 60 cc/hr  - Restart BPH medications  - DVT prophylaxis

## 2022-07-29 NOTE — PROGRESS NOTE ADULT - SUBJECTIVE AND OBJECTIVE BOX
Anesthesia Pain Management Service: Post Op Day _2_ of Epidural    SUBJECTIVE: Patient doing well with PCEA but has pain with movement. He states he has been feeling dizzy upon standing.  Pain Scale Score: 7/10  Refer to charted pain scores    THERAPY:  [x ] Epidural Bupivacaine 0.0625% and Hydromorphone  		[ X] 10 micrograms/mL	[ ] 5 micrograms/mL  [ ] Epidural Bupivacaine 0.0625% and Fentanyl - 2 micrograms/mL  [ ] Epidural Ropivacaine 0.1% plain – 1 mg/mL  [ ] Patient Controlled Regional Anesthesia (PCRA) Ropivacaine  		[ ] 0.2%			[ ] 0.1%    Demand dose __3_ lockout __15_ (minutes) Continuous Rate __4_ Total: __158.5__ ml used (in past 24 hours)      MEDICATIONS  (STANDING):  acetaminophen   IVPB .. 1000 milliGRAM(s) IV Intermittent every 6 hours  dextrose 5% + sodium chloride 0.45% 1000 milliLiter(s) (42 mL/Hr) IV Continuous <Continuous>  finasteride 5 milliGRAM(s) Oral daily  heparin   Injectable 5000 Unit(s) SubCutaneous every 8 hours  hydromorphone (10 MICROgram(s)/mL) + bupivacaine 0.0625% in 0.9% Sodium Chloride PCEA 250 milliLiter(s) Epidural PCA Continuous  insulin lispro (ADMELOG) corrective regimen sliding scale   SubCutaneous every 6 hours  pantoprazole  Injectable 40 milliGRAM(s) IV Push every 24 hours  tamsulosin 0.4 milliGRAM(s) Oral at bedtime    MEDICATIONS  (PRN):  hydromorphone (10 MICROgram(s)/mL) + bupivacaine 0.0625% in 0.9% Sodium Chloride PCEA Rescue Clinician  Bolus 5 milliLiter(s) Epidural every 15 minutes PRN for Pain Score greater than 6  naloxone Injectable 0.1 milliGRAM(s) IV Push every 3 minutes PRN For ANY of the following changes in patient status:  A. RR LESS THAN 10 breaths per minute, B. Oxygen saturation LESS THAN 90%, C. Sedation score of 6  ondansetron Injectable 4 milliGRAM(s) IV Push every 6 hours PRN Nausea      OBJECTIVE: Patient sitting up in bed, wife at bedside.    Assessment of Catheter Site:	[ ] Left	[ ] Right  [x ] Epidural 	[ ] Femoral	      [ ] Saphenous   [ ] Supraclavicular   [ ] Other:    [x ] Dressing intact	[x ] Site non-tender	[ x] Site without erythema, discharge, edema  [x ] Epidural tubing and connection checked	[x] Gross neurological exam within normal limits  [ ] Catheter removed – tip intact		[ ] Afebrile  	[ ] Febrile: ___   [ X] see Temp under VS below)    PT/INR - ( 29 Jul 2022 00:59 )   PT: 13.6 sec;   INR: 1.17 ratio         PTT - ( 29 Jul 2022 00:59 )  PTT:26.4 sec                      12.0   6.55  )-----------( 196      ( 29 Jul 2022 00:59 )             36.9     Vital Signs Last 24 Hrs  T(C): 37.1 (07-29-22 @ 08:15), Max: 37.1 (07-28-22 @ 23:00)  T(F): 98.7 (07-29-22 @ 08:15), Max: 98.8 (07-29-22 @ 03:00)  HR: 89 (07-29-22 @ 08:15) (69 - 89)  BP: 149/75 (07-29-22 @ 03:00) (97/62 - 149/75)  BP(mean): 82 (07-28-22 @ 20:00) (73 - 83)  RR: 18 (07-29-22 @ 03:00) (14 - 21)  SpO2: 100% (07-29-22 @ 03:00) (95% - 100%)      Sedation Score:	[x ] Alert	[ ] Drowsy	[ ] Arousable	[ ] Asleep	[ ] Unresponsive    Side Effects:	[x ] None	[ ] Nausea	[ ] Vomiting	[ ] Pruritus  		[ ] Weakness		[ ] Numbness	[ ] Other:    ASSESSMENT/ PLAN:    Therapy to  be:	[x ] Continue   [ ] Discontinued   [ ] Change to prn Analgesics    Documentation and Verification of current medications:  [ X ] Done	[ ] Not done, not eligible, reason:    Comments: Levels checked with some coverage from T5-T9. Discussed patient with Anesthesia pain attending. Continue PCEA.    Progress Note written now but Patient was seen earlier.

## 2022-07-29 NOTE — DISCHARGE NOTE NURSING/CASE MANAGEMENT/SOCIAL WORK - PATIENT PORTAL LINK FT
You can access the FollowMyHealth Patient Portal offered by [facility] by registering at the following website: [email or web address] By joining [email or web address] FollowMyHealth portal, you will also be able to view your health information using other applications (apps) compatible with our system.

## 2022-07-29 NOTE — DISCHARGE NOTE NURSING/CASE MANAGEMENT/SOCIAL WORK - NSDCPEFALRISK_GEN_ALL_CORE
For information on Fall & Injury Prevention, visit: [email or web address] OR  [email or web address] OR  https://www.cdc.gov/steadi/patient.html

## 2022-07-29 NOTE — PROGRESS NOTE ADULT - SUBJECTIVE AND OBJECTIVE BOX
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  SUBJECTIVE: Patient seen and examined at bedside with surgical team, patient without complaints. Denies fever, chills, CP, SOB nausea, vomiting, abdominal pain.    REVIEW OF SYSTEMS:  Constitutional: No fevers or chills. No malaise or weakness.  EENT: No vision changes. No ear pain. No nasal congestion or rhinitis. No throat pain or dysphagia.  Respiratory: No cough, wheezing, or SOB. No hemoptysis.  Cardiovascular: No chest pain or palpitations.  Gastrointestinal: No abdominal pain. No nausea, vomiting, diarrhea or constipation. No hematochezia. No melena.  Genitourinary: No dysuria, hematuria, or frequency.  Neurologic: No numbness or tingling. No weakness.  Skin: No rashes or pruritus.     OBJECTIVE:    Vital Signs Last 24 Hrs  T(C): 37.1 (28 Jul 2022 23:00), Max: 37.1 (28 Jul 2022 23:00)  T(F): 98.7 (28 Jul 2022 23:00), Max: 98.7 (28 Jul 2022 23:00)  HR: 78 (28 Jul 2022 23:00) (68 - 83)  BP: 140/73 (28 Jul 2022 23:00) (97/62 - 140/73)  BP(mean): 82 (28 Jul 2022 20:00) (70 - 91)  RR: 18 (28 Jul 2022 23:00) (10 - 21)  SpO2: 95% (28 Jul 2022 23:00) (95% - 100%)    Parameters below as of 28 Jul 2022 23:00  Patient On (Oxygen Delivery Method): room air    I&O's Detail    27 Jul 2022 07:01  -  28 Jul 2022 07:00  --------------------------------------------------------  IN:    IV PiggyBack: 350 mL    Lactated Ringers: 1800 mL  Total IN: 2150 mL    OUT:    Bulb (mL): 180 mL    Bulb (mL): 130 mL    Indwelling Catheter - Urethral (mL): 1974 mL    Nasogastric/Oral tube (mL): 400 mL  Total OUT: 2684 mL    Total NET: -534 mL      28 Jul 2022 07:01  -  29 Jul 2022 01:19  --------------------------------------------------------  IN:    dextrose 5% + sodium chloride 0.45% w/ Additives: 1512 mL    IV PiggyBack: 100 mL    Lactated Ringers: 100 mL    Lactated Ringers Bolus: 1000 mL    Oral Fluid: 120 mL  Total IN: 2832 mL    OUT:    Bulb (mL): 375 mL    Bulb (mL): 100 mL    Drain (mL): 0 mL    Indwelling Catheter - Urethral (mL): 820 mL    Nasogastric/Oral tube (mL): 200 mL  Total OUT: 1495 mL    Total NET: 1337 mL      MEDICATIONS  (STANDING):  dextrose 5% + sodium chloride 0.45% 1000 milliLiter(s) (84 mL/Hr) IV Continuous <Continuous>  finasteride 5 milliGRAM(s) Oral daily  heparin   Injectable 5000 Unit(s) SubCutaneous every 8 hours  hydromorphone (10 MICROgram(s)/mL) + bupivacaine 0.0625% in 0.9% Sodium Chloride PCEA 250 milliLiter(s) Epidural PCA Continuous  insulin lispro (ADMELOG) corrective regimen sliding scale   SubCutaneous every 6 hours  pantoprazole  Injectable 40 milliGRAM(s) IV Push every 24 hours    MEDICATIONS  (PRN):  hydromorphone (10 MICROgram(s)/mL) + bupivacaine 0.0625% in 0.9% Sodium Chloride PCEA Rescue Clinician  Bolus 5 milliLiter(s) Epidural every 15 minutes PRN for Pain Score greater than 6  naloxone Injectable 0.1 milliGRAM(s) IV Push every 3 minutes PRN For ANY of the following changes in patient status:  A. RR LESS THAN 10 breaths per minute, B. Oxygen saturation LESS THAN 90%, C. Sedation score of 6  ondansetron Injectable 4 milliGRAM(s) IV Push every 6 hours PRN Nausea      PHYSICAL EXAM:  Constitutional: A&Ox3, NAD  Respiratory: Unlabored breathing  Abdomen: Soft, nondistended, NTTP. No rebound or guarding.  Extremities: WWP, HAYNES spontaneously    LABS:                        11.4   7.05  )-----------( 196      ( 28 Jul 2022 18:50 )             34.5     07-28    132<L>  |  100  |  15  ----------------------------<  136<H>  4.3   |  22  |  0.75    Ca    7.9<L>      28 Jul 2022 18:50  Phos  2.8     07-28  Mg     1.90     07-28    TPro  5.4<L>  /  Alb  3.1<L>  /  TBili  0.4  /  DBili  x   /  AST  29  /  ALT  34  /  AlkPhos  42  07-28    PT/INR - ( 28 Jul 2022 00:49 )   PT: 13.4 sec;   INR: 1.15 ratio         PTT - ( 28 Jul 2022 00:49 )  PTT:19.8 sec  LIVER FUNCTIONS - ( 28 Jul 2022 18:50 )  Alb: 3.1 g/dL / Pro: 5.4 g/dL / ALK PHOS: 42 U/L / ALT: 34 U/L / AST: 29 U/L / GGT: x                 IMAGING:     TEAM [ D ] Surgery Daily Progress Note  =====================================================    SUBJECTIVE: Patient seen and examined at bedside on AM rounds. Patient is passing gas, but did not have bowel movement. Patient states he is experiencing dizziness when he stands up. Will order orthostatic vitals. Patient is tolerating 30cc/hour clear liquids. Denies fever, chill, N/V, chest pain, SOB    VITAL SIGNS:    --------------------------------------------------------------------------------------    EXAM    General: NAD, resting in bed comfortably.  Cardiac: regular rate, warm and well perfused  Respiratory: Nonlabored respirations, normal cw expansion.  Abdomen: soft, nontender, nondistended. ___ incision is c/d/i, ostomy, NGT, lopez.   Extremities: normal strength, FROM, no deformities    --------------------------------------------------------------------------------------    LABS    ((Insert SICU Labs here))***    --------------------------------------------------------------------------------------    INS AND OUTS:    ((Insert SICU Vitals/Is+Os here))***  -------------------------------------------------------------------------------------- TEAM [ D ] Surgery Daily Progress Note  =====================================================    SUBJECTIVE: Patient seen and examined at bedside on AM rounds. Patient is passing gas, but did not have bowel movement. Patient states he is experiencing dizziness when he stands up. Will order orthostatic vitals. Patient is tolerating 30cc/hour clear liquids. Denies fever, chill, N/V, chest pain, SOB    VITAL SIGNS:  ICU Vital Signs Last 24 Hrs  T(C): 37.1 (29 Jul 2022 03:00), Max: 37.1 (28 Jul 2022 23:00)  T(F): 98.8 (29 Jul 2022 03:00), Max: 98.8 (29 Jul 2022 03:00)  HR: 78 (29 Jul 2022 03:00) (69 - 83)  BP: 149/75 (29 Jul 2022 03:00) (97/62 - 149/75)  BP(mean): 82 (28 Jul 2022 20:00) (70 - 83)  ABP: --  ABP(mean): --  RR: 18 (29 Jul 2022 03:00) (14 - 21)  SpO2: 100% (29 Jul 2022 03:00) (95% - 100%)    O2 Parameters below as of 29 Jul 2022 03:00  Patient On (Oxygen Delivery Method): nasal cannula  O2 Flow (L/min): 2      I&O's Detail    28 Jul 2022 07:01  -  29 Jul 2022 07:00  --------------------------------------------------------  IN:    dextrose 5% + sodium chloride 0.45% w/ Additives: 1932 mL    IV PiggyBack: 100 mL    Lactated Ringers: 100 mL    Lactated Ringers Bolus: 1000 mL    Oral Fluid: 120 mL  Total IN: 3252 mL    OUT:    Bulb (mL): 150 mL    Bulb (mL): 435 mL    Drain (mL): 0 mL    Indwelling Catheter - Urethral (mL): 1920 mL    Nasogastric/Oral tube (mL): 200 mL  Total OUT: 2705 mL    Total NET: 547 mL      29 Jul 2022 07:01  -  29 Jul 2022 08:18  --------------------------------------------------------  IN:    dextrose 5% + sodium chloride 0.45% w/ Additives: 84 mL  Total IN: 84 mL    OUT:    Bulb (mL): 25 mL    Bulb (mL): 30 mL    Drain (mL): 0 mL    Indwelling Catheter - Urethral (mL): 300 mL    Oral Fluid: 0 mL  Total OUT: 355 mL    Total NET: -271 mL      EXAM    General: NAD, resting in bed comfortably.  Cardiac: regular rate, warm and well perfused  Respiratory: Nonlabored respirations, normal cw expansion.  Abdomen: soft, nontender, nondistended, prevena in place, HAIM x 2 SS, PCEA  Extremities: normal strength, FROM, no deformities    LABS                        12.0   6.55  )-----------( 196      ( 29 Jul 2022 00:59 )             36.9   07-29    134<L>  |  102  |  14  ----------------------------<  142<H>  5.3   |  26  |  0.84    Ca    8.4      29 Jul 2022 00:59  Phos  2.9     07-29  Mg     2.10     07-29    TPro  5.5<L>  /  Alb  3.4  /  TBili  0.4  /  DBili  x   /  AST  29  /  ALT  37  /  AlkPhos  47  07-29

## 2022-07-30 LAB
AMYLASE FLD-CCNC: 18 U/L — SIGNIFICANT CHANGE UP
AMYLASE FLD-CCNC: 22 U/L — SIGNIFICANT CHANGE UP
ANION GAP SERPL CALC-SCNC: 10 MMOL/L — SIGNIFICANT CHANGE UP (ref 7–14)
APTT BLD: 28.2 SEC — SIGNIFICANT CHANGE UP (ref 27–36.3)
BUN SERPL-MCNC: 11 MG/DL — SIGNIFICANT CHANGE UP (ref 7–23)
CALCIUM SERPL-MCNC: 8.3 MG/DL — LOW (ref 8.4–10.5)
CHLORIDE SERPL-SCNC: 102 MMOL/L — SIGNIFICANT CHANGE UP (ref 98–107)
CO2 SERPL-SCNC: 24 MMOL/L — SIGNIFICANT CHANGE UP (ref 22–31)
CREAT SERPL-MCNC: 0.75 MG/DL — SIGNIFICANT CHANGE UP (ref 0.5–1.3)
EGFR: 94 ML/MIN/1.73M2 — SIGNIFICANT CHANGE UP
GLUCOSE BLDC GLUCOMTR-MCNC: 106 MG/DL — HIGH (ref 70–99)
GLUCOSE BLDC GLUCOMTR-MCNC: 130 MG/DL — HIGH (ref 70–99)
GLUCOSE BLDC GLUCOMTR-MCNC: 133 MG/DL — HIGH (ref 70–99)
GLUCOSE BLDC GLUCOMTR-MCNC: 134 MG/DL — HIGH (ref 70–99)
GLUCOSE BLDC GLUCOMTR-MCNC: 176 MG/DL — HIGH (ref 70–99)
GLUCOSE SERPL-MCNC: 132 MG/DL — HIGH (ref 70–99)
HCT VFR BLD CALC: 40.3 % — SIGNIFICANT CHANGE UP (ref 39–50)
HGB BLD-MCNC: 13.1 G/DL — SIGNIFICANT CHANGE UP (ref 13–17)
INR BLD: 1.08 RATIO — SIGNIFICANT CHANGE UP (ref 0.88–1.16)
MAGNESIUM SERPL-MCNC: 1.9 MG/DL — SIGNIFICANT CHANGE UP (ref 1.6–2.6)
MCHC RBC-ENTMCNC: 31.4 PG — SIGNIFICANT CHANGE UP (ref 27–34)
MCHC RBC-ENTMCNC: 32.5 GM/DL — SIGNIFICANT CHANGE UP (ref 32–36)
MCV RBC AUTO: 96.6 FL — SIGNIFICANT CHANGE UP (ref 80–100)
NRBC # BLD: 0 /100 WBCS — SIGNIFICANT CHANGE UP
NRBC # FLD: 0 K/UL — SIGNIFICANT CHANGE UP
PHOSPHATE SERPL-MCNC: 2.3 MG/DL — LOW (ref 2.5–4.5)
PLATELET # BLD AUTO: 180 K/UL — SIGNIFICANT CHANGE UP (ref 150–400)
POTASSIUM SERPL-MCNC: 4.6 MMOL/L — SIGNIFICANT CHANGE UP (ref 3.5–5.3)
POTASSIUM SERPL-SCNC: 4.6 MMOL/L — SIGNIFICANT CHANGE UP (ref 3.5–5.3)
PROTHROM AB SERPL-ACNC: 12.5 SEC — SIGNIFICANT CHANGE UP (ref 10.5–13.4)
RBC # BLD: 4.17 M/UL — LOW (ref 4.2–5.8)
RBC # FLD: 14.2 % — SIGNIFICANT CHANGE UP (ref 10.3–14.5)
SODIUM SERPL-SCNC: 136 MMOL/L — SIGNIFICANT CHANGE UP (ref 135–145)
SPECIMEN SOURCE FLD: SIGNIFICANT CHANGE UP
TRIGL FLD-MCNC: 54 MG/DL — SIGNIFICANT CHANGE UP
TRIGL FLD-MCNC: 68 MG/DL — SIGNIFICANT CHANGE UP
TRIGL SERPL-MCNC: 54 MG/DL — SIGNIFICANT CHANGE UP
TRIGL SERPL-MCNC: 56 MG/DL — SIGNIFICANT CHANGE UP
WBC # BLD: 4.79 K/UL — SIGNIFICANT CHANGE UP (ref 3.8–10.5)
WBC # FLD AUTO: 4.79 K/UL — SIGNIFICANT CHANGE UP (ref 3.8–10.5)

## 2022-07-30 RX ORDER — INSULIN LISPRO 100/ML
VIAL (ML) SUBCUTANEOUS
Refills: 0 | Status: DISCONTINUED | OUTPATIENT
Start: 2022-07-30 | End: 2022-08-02

## 2022-07-30 RX ORDER — ACETAMINOPHEN 500 MG
1000 TABLET ORAL ONCE
Refills: 0 | Status: COMPLETED | OUTPATIENT
Start: 2022-07-30 | End: 2022-07-30

## 2022-07-30 RX ORDER — ACETAMINOPHEN 500 MG
1000 TABLET ORAL EVERY 6 HOURS
Refills: 0 | Status: DISCONTINUED | OUTPATIENT
Start: 2022-07-30 | End: 2022-07-31

## 2022-07-30 RX ADMIN — HEPARIN SODIUM 5000 UNIT(S): 5000 INJECTION INTRAVENOUS; SUBCUTANEOUS at 00:19

## 2022-07-30 RX ADMIN — FINASTERIDE 5 MILLIGRAM(S): 5 TABLET, FILM COATED ORAL at 12:11

## 2022-07-30 RX ADMIN — Medication 400 MILLIGRAM(S): at 06:45

## 2022-07-30 RX ADMIN — Medication 1000 MILLIGRAM(S): at 07:15

## 2022-07-30 RX ADMIN — HYDROMORPHONE HYDROCHLORIDE 250 MILLILITER(S): 2 INJECTION INTRAMUSCULAR; INTRAVENOUS; SUBCUTANEOUS at 19:19

## 2022-07-30 RX ADMIN — PANTOPRAZOLE SODIUM 40 MILLIGRAM(S): 20 TABLET, DELAYED RELEASE ORAL at 14:40

## 2022-07-30 RX ADMIN — Medication 400 MILLIGRAM(S): at 00:18

## 2022-07-30 RX ADMIN — HYDROMORPHONE HYDROCHLORIDE 250 MILLILITER(S): 2 INJECTION INTRAMUSCULAR; INTRAVENOUS; SUBCUTANEOUS at 07:34

## 2022-07-30 RX ADMIN — Medication 85 MILLIMOLE(S): at 09:46

## 2022-07-30 RX ADMIN — SODIUM CHLORIDE 42 MILLILITER(S): 9 INJECTION, SOLUTION INTRAVENOUS at 07:38

## 2022-07-30 RX ADMIN — Medication 400 MILLIGRAM(S): at 22:27

## 2022-07-30 RX ADMIN — Medication 1000 MILLIGRAM(S): at 22:57

## 2022-07-30 RX ADMIN — HEPARIN SODIUM 5000 UNIT(S): 5000 INJECTION INTRAVENOUS; SUBCUTANEOUS at 06:46

## 2022-07-30 RX ADMIN — Medication 1000 MILLIGRAM(S): at 00:48

## 2022-07-30 RX ADMIN — HEPARIN SODIUM 5000 UNIT(S): 5000 INJECTION INTRAVENOUS; SUBCUTANEOUS at 14:41

## 2022-07-30 RX ADMIN — TAMSULOSIN HYDROCHLORIDE 0.4 MILLIGRAM(S): 0.4 CAPSULE ORAL at 22:28

## 2022-07-30 RX ADMIN — HEPARIN SODIUM 5000 UNIT(S): 5000 INJECTION INTRAVENOUS; SUBCUTANEOUS at 22:28

## 2022-07-30 RX ADMIN — TAMSULOSIN HYDROCHLORIDE 0.4 MILLIGRAM(S): 0.4 CAPSULE ORAL at 00:19

## 2022-07-30 NOTE — PROGRESS NOTE ADULT - ATTENDING COMMENTS
Agree with assessment and plan.
I agree with the history, physical, and plan, which I have reviewed and edited where appropriate.  I agree with notes/assessment of health care providers on my service.  I have personally examined the patient.  I was physically present for the key portions of the evaluation and management (E/M) service provided.  I reviewed data and laboratory tests/x-rays and all pertinent electronic images.  The patient is a critical care patient with life threatening hemodynamic and metabolic instability in SICU.  Risk benefit analyses discussed.    The patient is in SICU with diagnosis mentioned in the note.    The plan is specified below.    ASSESSMENT: 77M w/PMH significant for HTN, T2DM, BPH w/hematuria, eczema, and pancreatic ductal dilation, now s/p Whipple for IPMN (7/27). SICU consulted for hemodynamic monitoring in the post-operative period. PT stable for transfer to floor.     PLAN:     Neurologic:   - Pain control with PCEA, acetaminophen IV ATC, q6h x2 doses     Respiratory:   - Monitor SaO2     Cardiovascular:   - Maintain MAP >65    Gastrointestinal/Nutrition: s/p whipple   - d/c NGT  - start sips   - Protonix qd     Renal/Genitourinary:   - decrease IVF to 84cc/hr  - d/c lopez    Hematologic:   - VTE ppx with SQH     Infectious Disease:   - observe off abx    Endocrine:   - ISS .

## 2022-07-30 NOTE — PROGRESS NOTE ADULT - SUBJECTIVE AND OBJECTIVE BOX
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  SUBJECTIVE: Patient seen and examined at bedside with surgical team, patient without complaints. Denies fever, chills, CP, SOB nausea, vomiting, abdominal pain.    REVIEW OF SYSTEMS:  Constitutional: No fevers or chills. No malaise or weakness.  EENT: No vision changes. No ear pain. No nasal congestion or rhinitis. No throat pain or dysphagia.  Respiratory: No cough, wheezing, or SOB. No hemoptysis.  Cardiovascular: No chest pain or palpitations.  Gastrointestinal: No abdominal pain. No nausea, vomiting, diarrhea or constipation. No hematochezia. No melena.  Genitourinary: No dysuria, hematuria, or frequency.  Neurologic: No numbness or tingling. No weakness.  Skin: No rashes or pruritus.     OBJECTIVE:    Vital Signs Last 24 Hrs  T(C): 37 (30 Jul 2022 00:11), Max: 37.1 (29 Jul 2022 03:00)  T(F): 98.6 (30 Jul 2022 00:11), Max: 98.8 (29 Jul 2022 03:00)  HR: 79 (30 Jul 2022 00:11) (72 - 89)  BP: 143/70 (30 Jul 2022 00:11) (105/59 - 149/75)  BP(mean): --  RR: 17 (30 Jul 2022 00:11) (17 - 18)  SpO2: 97% (30 Jul 2022 00:11) (97% - 100%)    Parameters below as of 30 Jul 2022 00:11  Patient On (Oxygen Delivery Method): nasal cannula    I&O's Detail    28 Jul 2022 07:01  -  29 Jul 2022 07:00  --------------------------------------------------------  IN:    dextrose 5% + sodium chloride 0.45% w/ Additives: 1932 mL    IV PiggyBack: 100 mL    Lactated Ringers: 100 mL    Lactated Ringers Bolus: 1000 mL    Oral Fluid: 120 mL  Total IN: 3252 mL    OUT:    Bulb (mL): 150 mL    Bulb (mL): 435 mL    Drain (mL): 0 mL    Indwelling Catheter - Urethral (mL): 1920 mL    Nasogastric/Oral tube (mL): 200 mL  Total OUT: 2705 mL    Total NET: 547 mL      29 Jul 2022 07:01  -  30 Jul 2022 02:58  --------------------------------------------------------  IN:    dextrose 5% + sodium chloride 0.45% w/ Additives: 294 mL    dextrose 5% + sodium chloride 0.9%: 210 mL    Oral Fluid: 180 mL  Total IN: 684 mL    OUT:    Bulb (mL): 230 mL    Bulb (mL): 190 mL    Drain (mL): 0 mL    Indwelling Catheter - Urethral (mL): 300 mL    Voided (mL): 925 mL  Total OUT: 1645 mL    Total NET: -961 mL      MEDICATIONS  (STANDING):  acetaminophen   IVPB .. 1000 milliGRAM(s) IV Intermittent every 6 hours  dextrose 5% + sodium chloride 0.9%. 1000 milliLiter(s) (42 mL/Hr) IV Continuous <Continuous>  finasteride 5 milliGRAM(s) Oral daily  heparin   Injectable 5000 Unit(s) SubCutaneous every 8 hours  hydromorphone (10 MICROgram(s)/mL) + bupivacaine 0.0625% in 0.9% Sodium Chloride PCEA 250 milliLiter(s) Epidural PCA Continuous  insulin lispro (ADMELOG) corrective regimen sliding scale   SubCutaneous every 6 hours  pantoprazole  Injectable 40 milliGRAM(s) IV Push every 24 hours  tamsulosin 0.4 milliGRAM(s) Oral at bedtime    MEDICATIONS  (PRN):  hydromorphone (10 MICROgram(s)/mL) + bupivacaine 0.0625% in 0.9% Sodium Chloride PCEA Rescue Clinician  Bolus 5 milliLiter(s) Epidural every 15 minutes PRN for Pain Score greater than 6  naloxone Injectable 0.1 milliGRAM(s) IV Push every 3 minutes PRN For ANY of the following changes in patient status:  A. RR LESS THAN 10 breaths per minute, B. Oxygen saturation LESS THAN 90%, C. Sedation score of 6  ondansetron Injectable 4 milliGRAM(s) IV Push every 6 hours PRN Nausea      PHYSICAL EXAM:  Constitutional: A&Ox3, NAD  Respiratory: Unlabored breathing  Abdomen: Soft, nondistended, NTTP. No rebound or guarding.  Extremities: WWP, HAYNES spontaneously    LABS:                        12.0   6.55  )-----------( 196      ( 29 Jul 2022 00:59 )             36.9     07-29    134<L>  |  101  |  14  ----------------------------<  150<H>  4.6   |  24  |  0.77    Ca    8.4      29 Jul 2022 14:38  Phos  2.3     07-29  Mg     2.00     07-29    TPro  5.5<L>  /  Alb  3.4  /  TBili  0.4  /  DBili  x   /  AST  29  /  ALT  37  /  AlkPhos  47  07-29    PT/INR - ( 29 Jul 2022 00:59 )   PT: 13.6 sec;   INR: 1.17 ratio         PTT - ( 29 Jul 2022 00:59 )  PTT:26.4 sec  LIVER FUNCTIONS - ( 29 Jul 2022 00:59 )  Alb: 3.4 g/dL / Pro: 5.5 g/dL / ALK PHOS: 47 U/L / ALT: 37 U/L / AST: 29 U/L / GGT: x                 IMAGING:     TEAM Surgery Progress Note  Patient is a 77y old  Male who presents with a 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  SUBJECTIVE: Patient seen and examined at bedside with surgical team, patient without complaints. Patient is tolerative limited clear diet (<60cc) without nausea or vomiting. Patient is passing gas without bowel movements. No issues voiding. Denies fever, chills, CP, SOB nausea, vomiting, abdominal pain.    REVIEW OF SYSTEMS:  Constitutional: No fevers or chills. No malaise or weakness.  EENT: No vision changes. No ear pain. No nasal congestion or rhinitis. No throat pain or dysphagia.  Respiratory: No cough, wheezing, or SOB. No hemoptysis.  Cardiovascular: No chest pain or palpitations.  Gastrointestinal: No abdominal pain. No nausea, vomiting, diarrhea or constipation. No hematochezia. No melena.  Genitourinary: No dysuria, hematuria, or frequency.  Neurologic: No numbness or tingling. No weakness.  Skin: No rashes or pruritus.     OBJECTIVE:    Vital Signs Last 24 Hrs  T(C): 37 (30 Jul 2022 00:11), Max: 37.1 (29 Jul 2022 03:00)  T(F): 98.6 (30 Jul 2022 00:11), Max: 98.8 (29 Jul 2022 03:00)  HR: 79 (30 Jul 2022 00:11) (72 - 89)  BP: 143/70 (30 Jul 2022 00:11) (105/59 - 149/75)  BP(mean): --  RR: 17 (30 Jul 2022 00:11) (17 - 18)  SpO2: 97% (30 Jul 2022 00:11) (97% - 100%)    Parameters below as of 30 Jul 2022 00:11  Patient On (Oxygen Delivery Method): nasal cannula    I&O's Detail    28 Jul 2022 07:01  -  29 Jul 2022 07:00  --------------------------------------------------------  IN:    dextrose 5% + sodium chloride 0.45% w/ Additives: 1932 mL    IV PiggyBack: 100 mL    Lactated Ringers: 100 mL    Lactated Ringers Bolus: 1000 mL    Oral Fluid: 120 mL  Total IN: 3252 mL    OUT:    Bulb (mL): 150 mL    Bulb (mL): 435 mL    Drain (mL): 0 mL    Indwelling Catheter - Urethral (mL): 1920 mL    Nasogastric/Oral tube (mL): 200 mL  Total OUT: 2705 mL    Total NET: 547 mL      29 Jul 2022 07:01  -  30 Jul 2022 02:58  --------------------------------------------------------  IN:    dextrose 5% + sodium chloride 0.45% w/ Additives: 294 mL    dextrose 5% + sodium chloride 0.9%: 210 mL    Oral Fluid: 180 mL  Total IN: 684 mL    OUT:    Bulb (mL): 230 mL    Bulb (mL): 190 mL    Drain (mL): 0 mL    Indwelling Catheter - Urethral (mL): 300 mL    Voided (mL): 925 mL  Total OUT: 1645 mL    Total NET: -961 mL      MEDICATIONS  (STANDING):  acetaminophen   IVPB .. 1000 milliGRAM(s) IV Intermittent every 6 hours  dextrose 5% + sodium chloride 0.9%. 1000 milliLiter(s) (42 mL/Hr) IV Continuous <Continuous>  finasteride 5 milliGRAM(s) Oral daily  heparin   Injectable 5000 Unit(s) SubCutaneous every 8 hours  hydromorphone (10 MICROgram(s)/mL) + bupivacaine 0.0625% in 0.9% Sodium Chloride PCEA 250 milliLiter(s) Epidural PCA Continuous  insulin lispro (ADMELOG) corrective regimen sliding scale   SubCutaneous every 6 hours  pantoprazole  Injectable 40 milliGRAM(s) IV Push every 24 hours  tamsulosin 0.4 milliGRAM(s) Oral at bedtime    MEDICATIONS  (PRN):  hydromorphone (10 MICROgram(s)/mL) + bupivacaine 0.0625% in 0.9% Sodium Chloride PCEA Rescue Clinician  Bolus 5 milliLiter(s) Epidural every 15 minutes PRN for Pain Score greater than 6  naloxone Injectable 0.1 milliGRAM(s) IV Push every 3 minutes PRN For ANY of the following changes in patient status:  A. RR LESS THAN 10 breaths per minute, B. Oxygen saturation LESS THAN 90%, C. Sedation score of 6  ondansetron Injectable 4 milliGRAM(s) IV Push every 6 hours PRN Nausea      PHYSICAL EXAM:  Constitutional: A&Ox3, NAD  Respiratory: Unlabored breathing  Abdomen: Soft, nondistended, NTTP. No rebound or guarding.  Extremities: WWP, HAYNES spontaneously    LABS:                        12.0   6.55  )-----------( 196      ( 29 Jul 2022 00:59 )             36.9     07-29    134<L>  |  101  |  14  ----------------------------<  150<H>  4.6   |  24  |  0.77    Ca    8.4      29 Jul 2022 14:38  Phos  2.3     07-29  Mg     2.00     07-29    TPro  5.5<L>  /  Alb  3.4  /  TBili  0.4  /  DBili  x   /  AST  29  /  ALT  37  /  AlkPhos  47  07-29    PT/INR - ( 29 Jul 2022 00:59 )   PT: 13.6 sec;   INR: 1.17 ratio         PTT - ( 29 Jul 2022 00:59 )  PTT:26.4 sec  LIVER FUNCTIONS - ( 29 Jul 2022 00:59 )  Alb: 3.4 g/dL / Pro: 5.5 g/dL / ALK PHOS: 47 U/L / ALT: 37 U/L / AST: 29 U/L / GGT: x

## 2022-07-30 NOTE — PROGRESS NOTE ADULT - ASSESSMENT
76 yo M recovering appropriately from ex-lap whipple on 7/27 for IPMN.     Plan  - Whipple pathway POD2  - Diet: clear liquid diet < 60 cc/hr  - Restart BPH medications  - DVT prophylaxis 78 yo M recovering appropriately from ex-lap whipple on 7/27 for IPMN.     Plan  - Whipple pathway POD2  - Diet: Full liquid die  - ISS before meals and at bedtime  - Continue PCEA today  - F/u drain amylase and triglyceride  - Restart BPH medications  - DVT prophylaxis    Patient seen and examined with Dr. Caruso

## 2022-07-30 NOTE — PROGRESS NOTE ADULT - SUBJECTIVE AND OBJECTIVE BOX
Anesthesia Pain Management Service: Day _3_ of Epidural    SUBJECTIVE: Patient doing well with PCEA and no problems. Patient states his orthostatic hypotension has resolved and now able to ambulate without dizziness.   Pain Scale Score: 5/10  Refer to charted pain scores    THERAPY:  [x ] Epidural Bupivacaine 0.0625% and Hydromorphone  		[ X] 10 micrograms/mL	[ ] 5 micrograms/mL  [ ] Epidural Bupivacaine 0.0625% and Fentanyl - 2 micrograms/mL  [ ] Epidural Ropivacaine 0.1% plain – 1 mg/mL  [ ] Patient Controlled Regional Anesthesia (PCRA) Ropivacaine  		[ ] 0.2%			[ ] 0.1%    Demand dose __3_ lockout __15_ (minutes) Continuous Rate __4_ Total: _147.8___ ml used (in past 24 hours)      MEDICATIONS  (STANDING):  acetaminophen   IVPB .. 1000 milliGRAM(s) IV Intermittent every 6 hours  dextrose 5% + sodium chloride 0.9%. 1000 milliLiter(s) (42 mL/Hr) IV Continuous <Continuous>  finasteride 5 milliGRAM(s) Oral daily  heparin   Injectable 5000 Unit(s) SubCutaneous every 8 hours  hydromorphone (10 MICROgram(s)/mL) + bupivacaine 0.0625% in 0.9% Sodium Chloride PCEA 250 milliLiter(s) Epidural PCA Continuous  insulin lispro (ADMELOG) corrective regimen sliding scale   SubCutaneous every 6 hours  pantoprazole  Injectable 40 milliGRAM(s) IV Push every 24 hours  tamsulosin 0.4 milliGRAM(s) Oral at bedtime    MEDICATIONS  (PRN):  bisacodyl Suppository 10 milliGRAM(s) Rectal once PRN Constipation  hydromorphone (10 MICROgram(s)/mL) + bupivacaine 0.0625% in 0.9% Sodium Chloride PCEA Rescue Clinician  Bolus 5 milliLiter(s) Epidural every 15 minutes PRN for Pain Score greater than 6  naloxone Injectable 0.1 milliGRAM(s) IV Push every 3 minutes PRN For ANY of the following changes in patient status:  A. RR LESS THAN 10 breaths per minute, B. Oxygen saturation LESS THAN 90%, C. Sedation score of 6  ondansetron Injectable 4 milliGRAM(s) IV Push every 6 hours PRN Nausea      OBJECTIVE: Patient sitting up in chair.    Assessment of Catheter Site:	[ ] Left	[ ] Right  [x ] Epidural 	[ ] Femoral	      [ ] Saphenous   [ ] Supraclavicular   [ ] Other:    [x ] Dressing intact	[x ] Site non-tender	[ x] Site without erythema, discharge, edema  [x ] Epidural tubing and connection checked	[x] Gross neurological exam within normal limits  [ ] Catheter removed – tip intact		[ ] Afebrile  	[ ] Febrile: ___   [ X] see Temp under VS below)    PT/INR - ( 30 Jul 2022 05:33 )   PT: 12.5 sec;   INR: 1.08 ratio         PTT - ( 30 Jul 2022 05:33 )  PTT:28.2 sec                      13.1   4.79  )-----------( 180      ( 30 Jul 2022 05:33 )             40.3     Vital Signs Last 24 Hrs  T(C): 37.6 (07-30-22 @ 09:00), Max: 37.6 (07-30-22 @ 09:00)  T(F): 99.6 (07-30-22 @ 09:00), Max: 99.6 (07-30-22 @ 09:00)  HR: 79 (07-30-22 @ 09:00) (72 - 87)  BP: 115/64 (07-30-22 @ 09:00) (105/59 - 143/70)  BP(mean): --  RR: 18 (07-30-22 @ 09:00) (17 - 18)  SpO2: 96% (07-30-22 @ 09:00) (96% - 100%)      Sedation Score:	[x ] Alert	[ ] Drowsy	[ ] Arousable	[ ] Asleep	[ ] Unresponsive    Side Effects:	[x ] None	[ ] Nausea	[ ] Vomiting	[ ] Pruritus  		[ ] Weakness		[ ] Numbness	[ ] Other:    ASSESSMENT/ PLAN:    Therapy to  be:	[x ] Continue   [ ] Discontinued   [ ] Change to prn Analgesics    Documentation and Verification of current medications:  [ X ] Done	[ ] Not done, not eligible, reason:    Comments: Doing OK with epidural and may continue.    Progress Note written now but Patient was seen earlier.

## 2022-07-31 LAB
AMYLASE FLD-CCNC: 17 U/L — SIGNIFICANT CHANGE UP
AMYLASE P1 CFR SERPL: 29 U/L — SIGNIFICANT CHANGE UP (ref 25–125)
ANION GAP SERPL CALC-SCNC: 11 MMOL/L — SIGNIFICANT CHANGE UP (ref 7–14)
APTT BLD: 27.8 SEC — SIGNIFICANT CHANGE UP (ref 27–36.3)
BUN SERPL-MCNC: 8 MG/DL — SIGNIFICANT CHANGE UP (ref 7–23)
CALCIUM SERPL-MCNC: 8.1 MG/DL — LOW (ref 8.4–10.5)
CHLORIDE SERPL-SCNC: 103 MMOL/L — SIGNIFICANT CHANGE UP (ref 98–107)
CO2 SERPL-SCNC: 19 MMOL/L — LOW (ref 22–31)
CREAT SERPL-MCNC: 0.71 MG/DL — SIGNIFICANT CHANGE UP (ref 0.5–1.3)
EGFR: 95 ML/MIN/1.73M2 — SIGNIFICANT CHANGE UP
GLUCOSE BLDC GLUCOMTR-MCNC: 117 MG/DL — HIGH (ref 70–99)
GLUCOSE BLDC GLUCOMTR-MCNC: 126 MG/DL — HIGH (ref 70–99)
GLUCOSE BLDC GLUCOMTR-MCNC: 154 MG/DL — HIGH (ref 70–99)
GLUCOSE SERPL-MCNC: 143 MG/DL — HIGH (ref 70–99)
HCT VFR BLD CALC: 37.2 % — LOW (ref 39–50)
HGB BLD-MCNC: 12 G/DL — LOW (ref 13–17)
INR BLD: 1.15 RATIO — SIGNIFICANT CHANGE UP (ref 0.88–1.16)
MAGNESIUM SERPL-MCNC: 1.8 MG/DL — SIGNIFICANT CHANGE UP (ref 1.6–2.6)
MCHC RBC-ENTMCNC: 31 PG — SIGNIFICANT CHANGE UP (ref 27–34)
MCHC RBC-ENTMCNC: 32.3 GM/DL — SIGNIFICANT CHANGE UP (ref 32–36)
MCV RBC AUTO: 96.1 FL — SIGNIFICANT CHANGE UP (ref 80–100)
NRBC # BLD: 0 /100 WBCS — SIGNIFICANT CHANGE UP
NRBC # FLD: 0 K/UL — SIGNIFICANT CHANGE UP
PHOSPHATE SERPL-MCNC: 2.3 MG/DL — LOW (ref 2.5–4.5)
PLATELET # BLD AUTO: 202 K/UL — SIGNIFICANT CHANGE UP (ref 150–400)
POTASSIUM SERPL-MCNC: 4.1 MMOL/L — SIGNIFICANT CHANGE UP (ref 3.5–5.3)
POTASSIUM SERPL-SCNC: 4.1 MMOL/L — SIGNIFICANT CHANGE UP (ref 3.5–5.3)
PROTHROM AB SERPL-ACNC: 13.4 SEC — SIGNIFICANT CHANGE UP (ref 10.5–13.4)
RBC # BLD: 3.87 M/UL — LOW (ref 4.2–5.8)
RBC # FLD: 14.2 % — SIGNIFICANT CHANGE UP (ref 10.3–14.5)
SODIUM SERPL-SCNC: 133 MMOL/L — LOW (ref 135–145)
TRIGL SERPL-MCNC: 157 MG/DL — HIGH
WBC # BLD: 4.66 K/UL — SIGNIFICANT CHANGE UP (ref 3.8–10.5)
WBC # FLD AUTO: 4.66 K/UL — SIGNIFICANT CHANGE UP (ref 3.8–10.5)

## 2022-07-31 RX ORDER — SODIUM,POTASSIUM PHOSPHATES 278-250MG
1 POWDER IN PACKET (EA) ORAL THREE TIMES A DAY
Refills: 0 | Status: COMPLETED | OUTPATIENT
Start: 2022-07-31 | End: 2022-08-01

## 2022-07-31 RX ORDER — ACETAMINOPHEN 500 MG
1000 TABLET ORAL EVERY 6 HOURS
Refills: 0 | Status: DISCONTINUED | OUTPATIENT
Start: 2022-07-31 | End: 2022-08-02

## 2022-07-31 RX ORDER — LIDOCAINE 4 G/100G
1 CREAM TOPICAL EVERY 24 HOURS
Refills: 0 | Status: DISCONTINUED | OUTPATIENT
Start: 2022-07-31 | End: 2022-08-02

## 2022-07-31 RX ORDER — MAGNESIUM SULFATE 500 MG/ML
2 VIAL (ML) INJECTION ONCE
Refills: 0 | Status: COMPLETED | OUTPATIENT
Start: 2022-07-31 | End: 2022-07-31

## 2022-07-31 RX ORDER — FUROSEMIDE 40 MG
10 TABLET ORAL ONCE
Refills: 0 | Status: COMPLETED | OUTPATIENT
Start: 2022-07-31 | End: 2022-07-31

## 2022-07-31 RX ORDER — POTASSIUM PHOSPHATE, MONOBASIC POTASSIUM PHOSPHATE, DIBASIC 236; 224 MG/ML; MG/ML
30 INJECTION, SOLUTION INTRAVENOUS ONCE
Refills: 0 | Status: DISCONTINUED | OUTPATIENT
Start: 2022-07-31 | End: 2022-07-31

## 2022-07-31 RX ORDER — PANTOPRAZOLE SODIUM 20 MG/1
40 TABLET, DELAYED RELEASE ORAL
Refills: 0 | Status: DISCONTINUED | OUTPATIENT
Start: 2022-07-31 | End: 2022-08-02

## 2022-07-31 RX ORDER — SODIUM,POTASSIUM PHOSPHATES 278-250MG
1 POWDER IN PACKET (EA) ORAL THREE TIMES A DAY
Refills: 0 | Status: DISCONTINUED | OUTPATIENT
Start: 2022-07-31 | End: 2022-07-31

## 2022-07-31 RX ADMIN — FINASTERIDE 5 MILLIGRAM(S): 5 TABLET, FILM COATED ORAL at 12:35

## 2022-07-31 RX ADMIN — Medication 25 GRAM(S): at 17:57

## 2022-07-31 RX ADMIN — HEPARIN SODIUM 5000 UNIT(S): 5000 INJECTION INTRAVENOUS; SUBCUTANEOUS at 05:07

## 2022-07-31 RX ADMIN — HYDROMORPHONE HYDROCHLORIDE 250 MILLILITER(S): 2 INJECTION INTRAMUSCULAR; INTRAVENOUS; SUBCUTANEOUS at 07:27

## 2022-07-31 RX ADMIN — Medication 10 MILLIGRAM(S): at 12:38

## 2022-07-31 RX ADMIN — Medication 1: at 13:16

## 2022-07-31 RX ADMIN — HYDROMORPHONE HYDROCHLORIDE 250 MILLILITER(S): 2 INJECTION INTRAMUSCULAR; INTRAVENOUS; SUBCUTANEOUS at 01:14

## 2022-07-31 RX ADMIN — Medication 1 PACKET(S): at 12:35

## 2022-07-31 RX ADMIN — TAMSULOSIN HYDROCHLORIDE 0.4 MILLIGRAM(S): 0.4 CAPSULE ORAL at 22:11

## 2022-07-31 RX ADMIN — LIDOCAINE 1 PATCH: 4 CREAM TOPICAL at 20:00

## 2022-07-31 RX ADMIN — HEPARIN SODIUM 5000 UNIT(S): 5000 INJECTION INTRAVENOUS; SUBCUTANEOUS at 22:12

## 2022-07-31 RX ADMIN — Medication 1000 MILLIGRAM(S): at 23:13

## 2022-07-31 RX ADMIN — Medication 1000 MILLIGRAM(S): at 17:57

## 2022-07-31 RX ADMIN — Medication 1 PACKET(S): at 22:16

## 2022-07-31 RX ADMIN — PANTOPRAZOLE SODIUM 40 MILLIGRAM(S): 20 TABLET, DELAYED RELEASE ORAL at 12:35

## 2022-07-31 RX ADMIN — LIDOCAINE 1 PATCH: 4 CREAM TOPICAL at 17:56

## 2022-07-31 RX ADMIN — HEPARIN SODIUM 5000 UNIT(S): 5000 INJECTION INTRAVENOUS; SUBCUTANEOUS at 12:38

## 2022-07-31 RX ADMIN — HYDROMORPHONE HYDROCHLORIDE 250 MILLILITER(S): 2 INJECTION INTRAMUSCULAR; INTRAVENOUS; SUBCUTANEOUS at 19:27

## 2022-07-31 NOTE — PHYSICAL THERAPY INITIAL EVALUATION ADULT - BALANCE DISTURBANCE, IDENTIFIED IMPAIRMENT CONTRIBUTE, REHAB EVAL
- Pt was undergoing treatment in [place] impaired coordination/impaired motor control/impaired postural control

## 2022-07-31 NOTE — PHYSICAL THERAPY INITIAL EVALUATION ADULT - PERTINENT HX OF CURRENT PROBLEM, REHAB EVAL
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

## 2022-07-31 NOTE — PHYSICAL THERAPY INITIAL EVALUATION ADULT - DIAGNOSIS, PT EVAL
Upon evaluation, pt presents with impairments in transfers, gait, balance, and endurance. Pt would benefit from skilled PT services in the acute care setting to address impairments to facilitate return to prior level of function.

## 2022-07-31 NOTE — PHYSICAL THERAPY INITIAL EVALUATION ADULT - IMPAIRMENTS CONTRIBUTING TO GAIT DEVIATIONS, PT EVAL
impaired endurance/impaired balance/impaired coordination/impaired motor control/impaired postural control

## 2022-07-31 NOTE — PHYSICAL THERAPY INITIAL EVALUATION ADULT - GENERAL OBSERVATIONS, REHAB EVAL
Upon entry, pt seated in bedside chair in NAD; + wound vac, + continuous pulse oximeter, + spinal epidural. Pt left as received with all tubes/lines intact, call bell in reach and in NAD.

## 2022-07-31 NOTE — PROGRESS NOTE ADULT - ASSESSMENT
78 yo M recovering appropriately from ex-lap whipple on 7/27 for IPMN.     Plan  - Whipple pathway POD4  - Diet: Full liquid diet  - ISS before meals and at bedtime  - Continue PCEA until reg diet  - F/u drain amylase and triglyceride  - Restart BPH medications  - DVT prophylaxis  -Encourage OOB    Pager [phone]

## 2022-07-31 NOTE — PHYSICAL THERAPY INITIAL EVALUATION ADULT - TRANSFER SAFETY CONCERNS NOTED: SIT/STAND, REHAB EVAL
Experiences loss of balance posteriorly with initial stand requiring therapist assistance/losing balance

## 2022-07-31 NOTE — PROGRESS NOTE ADULT - SUBJECTIVE AND OBJECTIVE BOX
Anesthesia Pain Management Service: Day _5_ of Epidural    SUBJECTIVE: Patient doing well with PCEA and no problems. Patient's pain is by his right abdomen where his surgical incision is located.   Pain Scale Score:  5/10 Refer to charted pain scores    THERAPY:  [x ] Epidural Bupivacaine 0.0625% and Hydromorphone  		[ X] 10 micrograms/mL	[ ] 5 micrograms/mL  [ ] Epidural Bupivacaine 0.0625% and Fentanyl - 2 micrograms/mL  [ ] Epidural Ropivacaine 0.1% plain – 1 mg/mL  [ ] Patient Controlled Regional Anesthesia (PCRA) Ropivacaine  		[ ] 0.2%			[ ] 0.1%    Demand dose __3_ lockout __15_ (minutes) Continuous Rate 4_ml/hr__ Total: _115.1___ ml used (in past 24 hours)      MEDICATIONS  (STANDING):  acetaminophen   IVPB .. 1000 milliGRAM(s) IV Intermittent every 6 hours  finasteride 5 milliGRAM(s) Oral daily  heparin   Injectable 5000 Unit(s) SubCutaneous every 8 hours  hydromorphone (10 MICROgram(s)/mL) + bupivacaine 0.0625% in 0.9% Sodium Chloride PCEA 250 milliLiter(s) Epidural PCA Continuous  insulin lispro (ADMELOG) corrective regimen sliding scale   SubCutaneous Before meals and at bedtime  lidocaine   4% Patch 1 Patch Transdermal every 24 hours  magnesium sulfate  IVPB 2 Gram(s) IV Intermittent once  pantoprazole  Injectable 40 milliGRAM(s) IV Push every 24 hours  potassium phosphate / sodium phosphate Powder (PHOS-NaK) 1 Packet(s) Oral three times a day  tamsulosin 0.4 milliGRAM(s) Oral at bedtime    MEDICATIONS  (PRN):  bisacodyl Suppository 10 milliGRAM(s) Rectal once PRN Constipation  hydromorphone (10 MICROgram(s)/mL) + bupivacaine 0.0625% in 0.9% Sodium Chloride PCEA Rescue Clinician  Bolus 5 milliLiter(s) Epidural every 15 minutes PRN for Pain Score greater than 6  naloxone Injectable 0.1 milliGRAM(s) IV Push every 3 minutes PRN For ANY of the following changes in patient status:  A. RR LESS THAN 10 breaths per minute, B. Oxygen saturation LESS THAN 90%, C. Sedation score of 6  ondansetron Injectable 4 milliGRAM(s) IV Push every 6 hours PRN Nausea      OBJECTIVE:  Patient is sitting up in chair.    Assessment of Catheter Site:	[ ] Left	[ ] Right  [x ] Epidural 	[ ] Femoral	      [ ] Saphenous   [ ] Supraclavicular   [ ] Other:    [x ] Dressing intact	[x ] Site non-tender	[ x] Site without erythema, discharge, edema  [x ] Epidural tubing and connection checked	[x] Gross neurological exam within normal limits  [ ] Catheter removed – tip intact		[ ] Afebrile  	[ ] Febrile: ___   [ X] see Temp under VS below)    PT/INR - ( 31 Jul 2022 05:52 )   PT: 13.4 sec;   INR: 1.15 ratio         PTT - ( 31 Jul 2022 05:52 )  PTT:27.8 sec                      12.0   4.66  )-----------( 202      ( 31 Jul 2022 05:52 )             37.2     Vital Signs Last 24 Hrs  T(C): 36.7 (07-31-22 @ 11:49), Max: 37.1 (07-30-22 @ 21:12)  T(F): 98.1 (07-31-22 @ 11:49), Max: 98.8 (07-30-22 @ 21:12)  HR: 82 (07-31-22 @ 11:49) (79 - 85)  BP: 117/70 (07-31-22 @ 11:49) (117/70 - 148/80)  BP(mean): --  RR: 18 (07-31-22 @ 11:49) (18 - 18)  SpO2: 99% (07-31-22 @ 11:49) (97% - 100%)      Sedation Score:	[x ] Alert	[ ] Drowsy	[ ] Arousable	[ ] Asleep	[ ] Unresponsive    Side Effects:	[x ] None	[ ] Nausea	[ ] Vomiting	[ ] Pruritus  		[ ] Weakness		[ ] Numbness	[ ] Other:    ASSESSMENT/ PLAN:    Therapy to  be:	[x ] Continue   [ ] Discontinued   [ ] Change to prn Analgesics    Documentation and Verification of current medications:  [ X ] Done	[ ] Not done, not eligible, reason:    Comments: Doing OK with epidural and may continue.  Recommended increasing continuous rate for better pain control, patient refused.  Recommend non-opioid adjuvant analgesics to be used when possible and when allowed by primary surgical team.    Progress Note written now but Patient was seen earlier.

## 2022-07-31 NOTE — PHYSICAL THERAPY INITIAL EVALUATION ADULT - ADDITIONAL COMMENTS
Pt lives in a two-story home with his wife; there are 2 steps to enter and 10 steps to bedroom. Occasionally uses cane for ambulation.

## 2022-07-31 NOTE — PHYSICAL THERAPY INITIAL EVALUATION ADULT - PATIENT PROFILE REVIEW, REHAB EVAL
PT initial evaluation received and chart review completed. Pt agreeable to participate in PT evaluation. Pt cleared by BANDAR Borges./yes

## 2022-07-31 NOTE — PROGRESS NOTE ADULT - SUBJECTIVE AND OBJECTIVE BOX
TEAM [ D ] Surgery Daily Progress Note  =====================================================    SUBJECTIVE: Patient seen and examined at bedside on AM rounds. Patient reports that they're feeling well. Tolerating clear liquid diet, denies nausea, vomiting.  +  Flatus/ +   BM. OOB/Amublating as tolerated. Denies fever, chills.    ALLERGIES:  No Known Allergies      --------------------------------------------------------------------------------------    MEDICATIONS:    Neurologic Medications  acetaminophen   IVPB .. 1000 milliGRAM(s) IV Intermittent every 6 hours  hydromorphone (10 MICROgram(s)/mL) + bupivacaine 0.0625% in 0.9% Sodium Chloride PCEA 250 milliLiter(s) Epidural PCA Continuous  hydromorphone (10 MICROgram(s)/mL) + bupivacaine 0.0625% in 0.9% Sodium Chloride PCEA Rescue Clinician  Bolus 5 milliLiter(s) Epidural every 15 minutes PRN for Pain Score greater than 6  ondansetron Injectable 4 milliGRAM(s) IV Push every 6 hours PRN Nausea    Respiratory Medications    Cardiovascular Medications  tamsulosin 0.4 milliGRAM(s) Oral at bedtime    Gastrointestinal Medications  bisacodyl Suppository 10 milliGRAM(s) Rectal once PRN Constipation  magnesium sulfate  IVPB 2 Gram(s) IV Intermittent once  pantoprazole  Injectable 40 milliGRAM(s) IV Push every 24 hours  potassium phosphate / sodium phosphate Powder (PHOS-NaK) 1 Packet(s) Oral three times a day    Genitourinary Medications    Hematologic/Oncologic Medications  heparin   Injectable 5000 Unit(s) SubCutaneous every 8 hours    Antimicrobial/Immunologic Medications    Endocrine/Metabolic Medications  finasteride 5 milliGRAM(s) Oral daily  insulin lispro (ADMELOG) corrective regimen sliding scale   SubCutaneous Before meals and at bedtime    Topical/Other Medications  lidocaine   4% Patch 1 Patch Transdermal every 24 hours  naloxone Injectable 0.1 milliGRAM(s) IV Push every 3 minutes PRN For ANY of the following changes in patient status:  A. RR LESS THAN 10 breaths per minute, B. Oxygen saturation LESS THAN 90%, C. Sedation score of 6    --------------------------------------------------------------------------------------    VITAL SIGNS:  ICU Vital Signs Last 24 Hrs  T(C): 36.7 (31 Jul 2022 11:49), Max: 37.1 (30 Jul 2022 21:12)  T(F): 98.1 (31 Jul 2022 11:49), Max: 98.8 (30 Jul 2022 21:12)  HR: 82 (31 Jul 2022 11:49) (79 - 85)  BP: 117/70 (31 Jul 2022 11:49) (117/70 - 148/80)  BP(mean): --  ABP: --  ABP(mean): --  RR: 18 (31 Jul 2022 11:49) (18 - 18)  SpO2: 99% (31 Jul 2022 11:49) (97% - 100%)    O2 Parameters below as of 31 Jul 2022 11:49  Patient On (Oxygen Delivery Method): room air  --------------------------------------------------------------------------------------    EXAM    General: NAD, resting in bed comfortably.  Cardiac: regular rate, warm and well perfused  Respiratory: Nonlabored respirations, normal cw expansion.  Abdomen: soft, nontender, nondistended. prevena in place. JPx2 SS  Extremities: normal strength, FROM, no deformities    --------------------------------------------------------------------------------------    LABS                        12.0   4.66  )-----------( 202      ( 31 Jul 2022 05:52 )             37.2   07-31    133<L>  |  103  |  8   ----------------------------<  143<H>  4.1   |  19<L>  |  0.71    Ca    8.1<L>      31 Jul 2022 05:52  Phos  2.3     07-31  Mg     1.80     07-31  --------------------------------------------------------------------------------------    INS AND OUTS:  I&O's Detail    30 Jul 2022 07:01  -  31 Jul 2022 07:00  --------------------------------------------------------  IN:    dextrose 5% + sodium chloride 0.9%: 546 mL    Oral Fluid: 922 mL  Total IN: 1468 mL    OUT:    Bulb (mL): 210 mL    Bulb (mL): 390 mL    Drain (mL): 0 mL    Voided (mL): 1600 mL  Total OUT: 2200 mL    Total NET: -732 mL      31 Jul 2022 07:01  -  31 Jul 2022 13:28  --------------------------------------------------------  IN:  Total IN: 0 mL    OUT:    Bulb (mL): 120 mL    Bulb (mL): 40 mL    Voided (mL): 750 mL  Total OUT: 910 mL    Total NET: -910 mL    --------------------------------------------------------------------------------------

## 2022-08-01 ENCOUNTER — TRANSCRIPTION ENCOUNTER (OUTPATIENT)
Age: 77
End: 2022-08-01

## 2022-08-01 LAB
AMYLASE FLD-CCNC: 15 U/L — SIGNIFICANT CHANGE UP
AMYLASE FLD-CCNC: 18 U/L — SIGNIFICANT CHANGE UP
ANION GAP SERPL CALC-SCNC: 10 MMOL/L — SIGNIFICANT CHANGE UP (ref 7–14)
APTT BLD: 29.7 SEC — SIGNIFICANT CHANGE UP (ref 27–36.3)
BUN SERPL-MCNC: 9 MG/DL — SIGNIFICANT CHANGE UP (ref 7–23)
CALCIUM SERPL-MCNC: 7.8 MG/DL — LOW (ref 8.4–10.5)
CHLORIDE SERPL-SCNC: 104 MMOL/L — SIGNIFICANT CHANGE UP (ref 98–107)
CO2 SERPL-SCNC: 22 MMOL/L — SIGNIFICANT CHANGE UP (ref 22–31)
CREAT SERPL-MCNC: 0.74 MG/DL — SIGNIFICANT CHANGE UP (ref 0.5–1.3)
EGFR: 94 ML/MIN/1.73M2 — SIGNIFICANT CHANGE UP
GLUCOSE BLDC GLUCOMTR-MCNC: 105 MG/DL — HIGH (ref 70–99)
GLUCOSE BLDC GLUCOMTR-MCNC: 140 MG/DL — HIGH (ref 70–99)
GLUCOSE BLDC GLUCOMTR-MCNC: 142 MG/DL — HIGH (ref 70–99)
GLUCOSE BLDC GLUCOMTR-MCNC: 158 MG/DL — HIGH (ref 70–99)
GLUCOSE SERPL-MCNC: 104 MG/DL — HIGH (ref 70–99)
HCT VFR BLD CALC: 32.5 % — LOW (ref 39–50)
HGB BLD-MCNC: 10.8 G/DL — LOW (ref 13–17)
INR BLD: 1.12 RATIO — SIGNIFICANT CHANGE UP (ref 0.88–1.16)
MAGNESIUM SERPL-MCNC: 2.2 MG/DL — SIGNIFICANT CHANGE UP (ref 1.6–2.6)
MCHC RBC-ENTMCNC: 31.5 PG — SIGNIFICANT CHANGE UP (ref 27–34)
MCHC RBC-ENTMCNC: 33.2 GM/DL — SIGNIFICANT CHANGE UP (ref 32–36)
MCV RBC AUTO: 94.8 FL — SIGNIFICANT CHANGE UP (ref 80–100)
NRBC # BLD: 0 /100 WBCS — SIGNIFICANT CHANGE UP
NRBC # FLD: 0 K/UL — SIGNIFICANT CHANGE UP
PHOSPHATE SERPL-MCNC: 2.6 MG/DL — SIGNIFICANT CHANGE UP (ref 2.5–4.5)
PLATELET # BLD AUTO: 206 K/UL — SIGNIFICANT CHANGE UP (ref 150–400)
POTASSIUM SERPL-MCNC: 3.7 MMOL/L — SIGNIFICANT CHANGE UP (ref 3.5–5.3)
POTASSIUM SERPL-SCNC: 3.7 MMOL/L — SIGNIFICANT CHANGE UP (ref 3.5–5.3)
PROTHROM AB SERPL-ACNC: 13 SEC — SIGNIFICANT CHANGE UP (ref 10.5–13.4)
RBC # BLD: 3.43 M/UL — LOW (ref 4.2–5.8)
RBC # FLD: 14.5 % — SIGNIFICANT CHANGE UP (ref 10.3–14.5)
SODIUM SERPL-SCNC: 136 MMOL/L — SIGNIFICANT CHANGE UP (ref 135–145)
WBC # BLD: 3.57 K/UL — LOW (ref 3.8–10.5)
WBC # FLD AUTO: 3.57 K/UL — LOW (ref 3.8–10.5)

## 2022-08-01 RX ORDER — OXYCODONE HYDROCHLORIDE 5 MG/1
10 TABLET ORAL
Refills: 0 | Status: DISCONTINUED | OUTPATIENT
Start: 2022-08-01 | End: 2022-08-02

## 2022-08-01 RX ORDER — HEPARIN SODIUM 5000 [USP'U]/ML
5000 INJECTION INTRAVENOUS; SUBCUTANEOUS EVERY 8 HOURS
Refills: 0 | Status: DISCONTINUED | OUTPATIENT
Start: 2022-08-01 | End: 2022-08-02

## 2022-08-01 RX ORDER — PANTOPRAZOLE SODIUM 20 MG/1
1 TABLET, DELAYED RELEASE ORAL
Qty: 30 | Refills: 0
Start: 2022-08-01 | End: 2022-08-30

## 2022-08-01 RX ORDER — ACETAMINOPHEN 500 MG
2 TABLET ORAL
Qty: 0 | Refills: 0 | DISCHARGE
Start: 2022-08-01

## 2022-08-01 RX ORDER — OXYCODONE HYDROCHLORIDE 5 MG/1
5 TABLET ORAL
Refills: 0 | Status: DISCONTINUED | OUTPATIENT
Start: 2022-08-01 | End: 2022-08-02

## 2022-08-01 RX ADMIN — Medication 1: at 13:13

## 2022-08-01 RX ADMIN — OXYCODONE HYDROCHLORIDE 5 MILLIGRAM(S): 5 TABLET ORAL at 23:00

## 2022-08-01 RX ADMIN — LIDOCAINE 1 PATCH: 4 CREAM TOPICAL at 19:46

## 2022-08-01 RX ADMIN — TAMSULOSIN HYDROCHLORIDE 0.4 MILLIGRAM(S): 0.4 CAPSULE ORAL at 21:50

## 2022-08-01 RX ADMIN — Medication 1000 MILLIGRAM(S): at 06:32

## 2022-08-01 RX ADMIN — Medication 1 PACKET(S): at 05:48

## 2022-08-01 RX ADMIN — HEPARIN SODIUM 5000 UNIT(S): 5000 INJECTION INTRAVENOUS; SUBCUTANEOUS at 21:50

## 2022-08-01 RX ADMIN — Medication 1000 MILLIGRAM(S): at 13:45

## 2022-08-01 RX ADMIN — OXYCODONE HYDROCHLORIDE 5 MILLIGRAM(S): 5 TABLET ORAL at 21:50

## 2022-08-01 RX ADMIN — Medication 1000 MILLIGRAM(S): at 05:48

## 2022-08-01 RX ADMIN — LIDOCAINE 1 PATCH: 4 CREAM TOPICAL at 05:48

## 2022-08-01 RX ADMIN — Medication 1000 MILLIGRAM(S): at 17:23

## 2022-08-01 RX ADMIN — OXYCODONE HYDROCHLORIDE 10 MILLIGRAM(S): 5 TABLET ORAL at 15:30

## 2022-08-01 RX ADMIN — FINASTERIDE 5 MILLIGRAM(S): 5 TABLET, FILM COATED ORAL at 13:13

## 2022-08-01 RX ADMIN — HYDROMORPHONE HYDROCHLORIDE 250 MILLILITER(S): 2 INJECTION INTRAMUSCULAR; INTRAVENOUS; SUBCUTANEOUS at 07:32

## 2022-08-01 RX ADMIN — OXYCODONE HYDROCHLORIDE 10 MILLIGRAM(S): 5 TABLET ORAL at 15:01

## 2022-08-01 RX ADMIN — Medication 1000 MILLIGRAM(S): at 13:13

## 2022-08-01 RX ADMIN — Medication 1000 MILLIGRAM(S): at 00:10

## 2022-08-01 RX ADMIN — LIDOCAINE 1 PATCH: 4 CREAM TOPICAL at 17:23

## 2022-08-01 RX ADMIN — HEPARIN SODIUM 5000 UNIT(S): 5000 INJECTION INTRAVENOUS; SUBCUTANEOUS at 05:48

## 2022-08-01 RX ADMIN — PANTOPRAZOLE SODIUM 40 MILLIGRAM(S): 20 TABLET, DELAYED RELEASE ORAL at 05:48

## 2022-08-01 NOTE — DISCHARGE NOTE PROVIDER - NSDCMRMEDTOKEN_GEN_ALL_CORE_FT
acetaminophen 500 mg oral tablet: 2 tab(s) orally every 6 hours  finasteride 5 mg oral tablet: 1 tab(s) orally once a day ; pm  lisinopril 20 mg oral tablet: 1 tab(s) orally once a day  metFORMIN 500 mg oral tablet: 2 tab(s) orally 2 times a day  Norvasc 5 mg oral tablet: 1 tab(s) orally once a day  tamsulosin 0.4 mg oral capsule: 1 cap(s) orally once a day; pm

## 2022-08-01 NOTE — DISCHARGE NOTE PROVIDER - HOSPITAL COURSE
78 y/o male with a PMHx HTN, DM, BPH, pancreatic IPMN presented for and is now s/p whipple on 7/27/22. The patient tolerated the procedure well. Post-operatively the patient was sent to the SICU. The patient was hemodynamically stable and was transferred to a surgical floor. Patient tolerated operation well and there were no post operative complications identified. The patient had daily wound care and was seen by physical therapy which recommended discharge to home w/ home PT. The patient's pain was controlled by IV pain medications and then by PO pain medications. The patient was advanced to a regular diet and tolerated it well. The patient was placed on home medications. At the time of discharge, the patient was hemodynamically stable, was tolerating PO diet, was voiding urine and passing stool, was ambulating, and was comfortable with adequate pain control. The patient was instructed to follow up with Dr. Ahn within 2 weeks after discharge from the hospital. The patient/family felt comfortable with discharge. The patient had no other issues. 76 y/o male with a PMHx HTN, DM, BPH, pancreatic IPMN presented for and is now s/p whipple on 7/27/22. The patient tolerated the procedure well. Post-operatively the patient was transferred to the SICU for close hemodynamic monitoring. When hemodynamically stable pt was transferred to a surgical floor. Pt followed the post op whipple pathway and diet advanced as tolerated  to bariatric pahse 3 diet. The patient had daily wound care and was seen by physical therapy which recommended discharge to home w/ home PT. The patient's pain was controlled by PCEA and then transitioned to PO pain medications when tolerating a diet.  The patient was placed on home medications. One jourdan drain rmeoved prior to discharge. Pt being discharged with one drain in place, teaching provided while in hospital. At the time of discharge, the patient was hemodynamically stable, was tolerating PO diet, was voiding urine and passing stool, was ambulating, and was comfortable with adequate pain control. The patient was instructed to follow up with Dr. Ahn within 2 weeks after discharge from the hospital. The patient/family felt comfortable with discharge. The patient had no other issues.

## 2022-08-01 NOTE — PROGRESS NOTE ADULT - SUBJECTIVE AND OBJECTIVE BOX
Anesthesia Pain Management Service: Day _6_ of Epidural    SUBJECTIVE: Patient doing well with PCEA and no problems.  Pain Scale Score: 4/10  Refer to charted pain scores    THERAPY:  [x ] Epidural Bupivacaine 0.0625% and Hydromorphone  		[X ] 10 micrograms/mL	[ ] 5 micrograms/mL  [ ] Epidural Bupivacaine 0.0625% and Fentanyl - 2 micrograms/mL  [ ] Epidural Ropivacaine 0.1% plain – 1 mg/mL  [ ] Patient Controlled Regional Anesthesia (PCRA) Ropivacaine  		[ ] 0.2%			[ ] 0.1%    Demand dose __3_ lockout __15_ (minutes) Continuous Rate _4__ Total: __160.6_ Daily      MEDICATIONS  (STANDING):  acetaminophen     Tablet .. 1000 milliGRAM(s) Oral every 6 hours  finasteride 5 milliGRAM(s) Oral daily  insulin lispro (ADMELOG) corrective regimen sliding scale   SubCutaneous Before meals and at bedtime  lidocaine   4% Patch 1 Patch Transdermal every 24 hours  pantoprazole    Tablet 40 milliGRAM(s) Oral before breakfast  tamsulosin 0.4 milliGRAM(s) Oral at bedtime    MEDICATIONS  (PRN):  bisacodyl Suppository 10 milliGRAM(s) Rectal once PRN Constipation  naloxone Injectable 0.1 milliGRAM(s) IV Push every 3 minutes PRN For ANY of the following changes in patient status:  A. RR LESS THAN 10 breaths per minute, B. Oxygen saturation LESS THAN 90%, C. Sedation score of 6  ondansetron Injectable 4 milliGRAM(s) IV Push every 6 hours PRN Nausea  oxyCODONE    IR 5 milliGRAM(s) Oral every 3 hours PRN Moderate Pain (4 - 6)  oxyCODONE    IR 10 milliGRAM(s) Oral every 3 hours PRN Severe Pain (7 - 10)      OBJECTIVE: Patient sitting up in chair, wife at bedside.    Assessment of Catheter Site:	[ ] Left	[ ] Right  [x ] Epidural 	[ ] Femoral	      [ ] Saphenous   [ ] Supraclavicular   [ ] Other:    [x ] Dressing intact	[x ] Site non-tender	[ x] Site without erythema, discharge, edema  [x ] Epidural tubing and connection checked	[x] Gross neurological exam within normal limits  [X ] Catheter removed – tip intact		[ ] Afebrile	  [ ] Febrile: ___       [ X] see Temp under VS below)    PT/INR - ( 01 Aug 2022 05:24 )   PT: 13.0 sec;   INR: 1.12 ratio         PTT - ( 01 Aug 2022 05:24 )  PTT:29.7 sec                      10.8   3.57  )-----------( 206      ( 01 Aug 2022 05:24 )             32.5     Vital Signs Last 24 Hrs  T(C): 36.8 (08-01-22 @ 08:34), Max: 37.2 (07-31-22 @ 21:01)  T(F): 98.2 (08-01-22 @ 08:34), Max: 98.9 (07-31-22 @ 21:01)  HR: 76 (08-01-22 @ 08:34) (73 - 83)  BP: 112/58 (08-01-22 @ 08:34) (112/58 - 137/71)  BP(mean): --  RR: 18 (08-01-22 @ 08:34) (18 - 18)  SpO2: 100% (08-01-22 @ 08:34) (97% - 100%)      Sedation Score:	[x ] Alert	[ ] Drowsy	[ ] Arousable	[ ] Asleep	[ ] Unresponsive    Side Effects:	[x ] None	[ ] Nausea	[ ] Vomiting	[ ] Pruritus  		[ ] Weakness		[ ] Numbness	[ ] Other:    ASSESSMENT/ PLAN:    Therapy to  be:	[ ] Continue   [ X] Discontinued   [ X] Change to prn Analgesics    Documentation and Verification of current medications:  [ X ] Done	[ ] Not done, not eligible, reason:    Comments: Discussed patient with primary team, PCEA discontinued. Blue tip intact. Changed to IV/oral opioid and/or non-opioid Adjuvant analgesics to be used at this point.    Progress Note written now but Patient was seen earlier.

## 2022-08-01 NOTE — DISCHARGE NOTE PROVIDER - NSDCCPTREATMENT_GEN_ALL_CORE_FT
PRINCIPAL PROCEDURE  Procedure: Pancreatectomy, Whipple type, with pancreatojejunostomy  Findings and Treatment:

## 2022-08-01 NOTE — PROGRESS NOTE ADULT - SUBJECTIVE AND OBJECTIVE BOX
Anesthesia Pain Management Service    SUBJECTIVE: Pt doing well with PCEA removed & no problems reported.    Therapy:	  [ ] IV PCA	   [ X] Epidural stopped          [ ] s/p Spinal Opoid              [ ] Postpartum infusion	  [ ] Patient controlled regional anesthesia (PCRA)    [ ] prn Analgesics    Allergies    No Known Allergies    Intolerances      MEDICATIONS  (STANDING):  acetaminophen     Tablet .. 1000 milliGRAM(s) Oral every 6 hours  finasteride 5 milliGRAM(s) Oral daily  heparin   Injectable 5000 Unit(s) SubCutaneous every 8 hours  insulin lispro (ADMELOG) corrective regimen sliding scale   SubCutaneous Before meals and at bedtime  lidocaine   4% Patch 1 Patch Transdermal every 24 hours  pantoprazole    Tablet 40 milliGRAM(s) Oral before breakfast  tamsulosin 0.4 milliGRAM(s) Oral at bedtime    MEDICATIONS  (PRN):  bisacodyl Suppository 10 milliGRAM(s) Rectal once PRN Constipation  naloxone Injectable 0.1 milliGRAM(s) IV Push every 3 minutes PRN For ANY of the following changes in patient status:  A. RR LESS THAN 10 breaths per minute, B. Oxygen saturation LESS THAN 90%, C. Sedation score of 6  ondansetron Injectable 4 milliGRAM(s) IV Push every 6 hours PRN Nausea  oxyCODONE    IR 5 milliGRAM(s) Oral every 3 hours PRN Moderate Pain (4 - 6)  oxyCODONE    IR 10 milliGRAM(s) Oral every 3 hours PRN Severe Pain (7 - 10)      OBJECTIVE:   [X] No new signs     [ ] Other:    Side Effects:  [X ] None			[ ] Other:    Assessment of Catheter Site:		[ ] Intact		[ ] Other:    ASSESSMENT/PLAN  [ ] Continue current therapy    [X ] Therapy changed to:    [ ] IV PCA       [ ] Epidural     [ X] prn Analgesics     Comments: Epidural removed & oral/IV opioids and/or non-opioid Adjuvant analgesics to be used at this point.     Progress Note written now but Patient was seen earlier.

## 2022-08-01 NOTE — DISCHARGE NOTE PROVIDER - CARE PROVIDER_API CALL
Prasad Ahn)  Surgery  [address] [facility], [place] [address]  Phone: [phone]  Fax: ()-  Follow Up Time: 2 weeks   Prasad Ahn)  Surgery  [address] [facility], [place] [address]  Phone: [phone]  Fax: ()-  Follow Up Time:

## 2022-08-01 NOTE — PROGRESS NOTE ADULT - ASSESSMENT
78 yo M recovering appropriately from ex-lap whipple on 7/27 for IPMN.     Plan  - Whipple pathway POD4  - Diet: Full liquid diet  - ISS before meals and at bedtime  - Continue PCEA until reg diet  - F/u drain amylase and triglyceride  - Restart BPH medications  - DVT prophylaxis  -Encourage OOB    Pager [phone] 76 yo M recovering appropriately from ex-lap whipple on 7/27 for IPMN.     Plan  - Whipple pathway   - Diet: adv bariatric phase 3  - ISS before meals and at bedtime  - dc PCEA today, standing tylenol/po oxy prn pain   - F/u drain amylase and triglyceride, dc drain with lower output  -home meds  - DVT prophylaxis  -Encourage OOB    Pager [phone]

## 2022-08-01 NOTE — DISCHARGE NOTE PROVIDER - NSDCCPCAREPLAN_GEN_ALL_CORE_FT
PRINCIPAL DISCHARGE DIAGNOSIS  Diagnosis: Intraductal papillary mucinous neoplasm  Assessment and Plan of Treatment: s/p whipple  WOUND CARE:  Please keep incisions clean and dry. Please do not Scrub or rub incisions. Do not use lotion or powder on incisions.   BATHING: You may shower and/or sponge bathe. You may use warm soapy water in the shower and rinse, pat dry.  ACTIVITY: No heavy lifting or straining. Otherwise, you may return to your usual level of physical activity. If you are taking narcotic pain medication DO NOT drive a car, operate machinery or make important decisions.  DIET: Return to your usual diet.  NOTIFY YOUR SURGEON IF YOU HAVE: any bleeding that does not stop, any pus draining from your wound(s), any fever (over 100.4 F) persistent nausea/vomiting, or if your pain is not controlled on your discharge pain medications, unable to urinate.  FOLLOW UP:  1. Please follow up with your primary care physician in one week regarding your hospitalization, bring copies of your discharge paperwork.  2. Please follow up with your surgeon, Dr. Ahn within 1-2 weeks of discharge. Please call to schedule an appointment.       PRINCIPAL DISCHARGE DIAGNOSIS  Diagnosis: Intraductal papillary mucinous neoplasm  Assessment and Plan of Treatment: s/p whipple  WOUND CARE:  Please keep incisions clean and dry. Please do not Scrub or rub incisions. Do not use lotion or powder on incisions.  You are being discharged home with a drain in place. Please empty, measure, and record drain outputs. Please bring compy of recorded outputs to follow up visit   BATHING: You may shower and/or sponge bathe. You may use warm soapy water in the shower and rinse, pat dry.  ACTIVITY: No heavy lifting or straining. Otherwise, you may return to your usual level of physical activity. If you are taking narcotic pain medication DO NOT drive a car, operate machinery or make important decisions.  DIET: bariatric phase 3 diet  NOTIFY YOUR SURGEON IF YOU HAVE: any bleeding that does not stop, any pus draining from your wound(s), any fever (over 100.4 F) persistent nausea/vomiting, or if your pain is not controlled on your discharge pain medications, unable to urinate.  FOLLOW UP:  1. Please follow up with your primary care physician in one week regarding your hospitalization, bring copies of your discharge paperwork.  2. Please follow up with your surgeon, Dr. Ahn within 1-2 weeks of discharge. Please call to schedule an appointment.

## 2022-08-01 NOTE — PROGRESS NOTE ADULT - SUBJECTIVE AND OBJECTIVE BOX
TEAM [ D ] Surgery Daily Progress Note  =====================================================    SUBJECTIVE: Patient seen and examined at bedside on AM rounds. Patient reports that they're feeling well. Tolerating clear liquid diet, denies nausea, vomiting.  +  Flatus/ +   BM. OOB/Amublating as tolerated. Denies fever, chills.    ALLERGIES:  No Known Allergies      --------------------------------------------------------------------------------------    MEDICATIONS:    Neurologic Medications  acetaminophen   IVPB .. 1000 milliGRAM(s) IV Intermittent every 6 hours  hydromorphone (10 MICROgram(s)/mL) + bupivacaine 0.0625% in 0.9% Sodium Chloride PCEA 250 milliLiter(s) Epidural PCA Continuous  hydromorphone (10 MICROgram(s)/mL) + bupivacaine 0.0625% in 0.9% Sodium Chloride PCEA Rescue Clinician  Bolus 5 milliLiter(s) Epidural every 15 minutes PRN for Pain Score greater than 6  ondansetron Injectable 4 milliGRAM(s) IV Push every 6 hours PRN Nausea    Respiratory Medications    Cardiovascular Medications  tamsulosin 0.4 milliGRAM(s) Oral at bedtime    Gastrointestinal Medications  bisacodyl Suppository 10 milliGRAM(s) Rectal once PRN Constipation  magnesium sulfate  IVPB 2 Gram(s) IV Intermittent once  pantoprazole  Injectable 40 milliGRAM(s) IV Push every 24 hours  potassium phosphate / sodium phosphate Powder (PHOS-NaK) 1 Packet(s) Oral three times a day    Genitourinary Medications    Hematologic/Oncologic Medications  heparin   Injectable 5000 Unit(s) SubCutaneous every 8 hours    Antimicrobial/Immunologic Medications    Endocrine/Metabolic Medications  finasteride 5 milliGRAM(s) Oral daily  insulin lispro (ADMELOG) corrective regimen sliding scale   SubCutaneous Before meals and at bedtime    Topical/Other Medications  lidocaine   4% Patch 1 Patch Transdermal every 24 hours  naloxone Injectable 0.1 milliGRAM(s) IV Push every 3 minutes PRN For ANY of the following changes in patient status:  A. RR LESS THAN 10 breaths per minute, B. Oxygen saturation LESS THAN 90%, C. Sedation score of 6    --------------------------------------------------------------------------------------    VITAL SIGNS:  Vital Signs Last 24 Hrs  T(C): 36.9 (01 Aug 2022 02:00), Max: 37.2 (31 Jul 2022 21:01)  T(F): 98.4 (01 Aug 2022 02:00), Max: 98.9 (31 Jul 2022 21:01)  HR: 78 (01 Aug 2022 02:00) (73 - 83)  BP: 125/65 (01 Aug 2022 02:00) (117/70 - 137/83)  BP(mean): --  RR: 18 (01 Aug 2022 02:00) (18 - 18)  SpO2: 99% (01 Aug 2022 02:00) (97% - 100%)    Parameters below as of 01 Aug 2022 02:00  Patient On (Oxygen Delivery Method): room air      --------------------------------------------------------------------------------------    EXAM    General: NAD, resting in bed comfortably.  Cardiac: regular rate, warm and well perfused  Respiratory: Nonlabored respirations, normal cw expansion.  Abdomen: soft, nontender, nondistended. prevena in place. JPx2 SS  Extremities: normal strength, FROM, no deformities    --------------------------------------------------------------------------------------    LABS                 --------------------------------------------------------------------------------------    I&O's Detail    30 Jul 2022 07:01  -  31 Jul 2022 07:00  --------------------------------------------------------  IN:    dextrose 5% + sodium chloride 0.9%: 546 mL    Oral Fluid: 922 mL  Total IN: 1468 mL    OUT:    Bulb (mL): 210 mL    Bulb (mL): 390 mL    Drain (mL): 0 mL    Voided (mL): 1600 mL  Total OUT: 2200 mL    Total NET: -732 mL      31 Jul 2022 07:01  -  01 Aug 2022 03:19  --------------------------------------------------------  IN:  Total IN: 0 mL    OUT:    Bulb (mL): 330 mL    Bulb (mL): 130 mL    Voided (mL): 2085 mL  Total OUT: 2545 mL    Total NET: -2545 mL        -------------------------------------------------------------------------------------- D TEAM Surgery Daily Progress Note  =====================================================    SUBJECTIVE: Patient seen and examined at bedside on AM rounds. Patient reports that they're feeling well. Tolerating FLD, denies nausea, vomiting.  +  Flatus/ +   BM. OOB/Amublating as tolerated. Denies fever, chills.    ALLERGIES:  No Known Allergies      --------------------------------------------------------------------------------------    MEDICATIONS:    Neurologic Medications  acetaminophen   IVPB .. 1000 milliGRAM(s) IV Intermittent every 6 hours  hydromorphone (10 MICROgram(s)/mL) + bupivacaine 0.0625% in 0.9% Sodium Chloride PCEA 250 milliLiter(s) Epidural PCA Continuous  hydromorphone (10 MICROgram(s)/mL) + bupivacaine 0.0625% in 0.9% Sodium Chloride PCEA Rescue Clinician  Bolus 5 milliLiter(s) Epidural every 15 minutes PRN for Pain Score greater than 6  ondansetron Injectable 4 milliGRAM(s) IV Push every 6 hours PRN Nausea    Respiratory Medications    Cardiovascular Medications  tamsulosin 0.4 milliGRAM(s) Oral at bedtime    Gastrointestinal Medications  bisacodyl Suppository 10 milliGRAM(s) Rectal once PRN Constipation  magnesium sulfate  IVPB 2 Gram(s) IV Intermittent once  pantoprazole  Injectable 40 milliGRAM(s) IV Push every 24 hours  potassium phosphate / sodium phosphate Powder (PHOS-NaK) 1 Packet(s) Oral three times a day    Genitourinary Medications    Hematologic/Oncologic Medications  heparin   Injectable 5000 Unit(s) SubCutaneous every 8 hours    Antimicrobial/Immunologic Medications    Endocrine/Metabolic Medications  finasteride 5 milliGRAM(s) Oral daily  insulin lispro (ADMELOG) corrective regimen sliding scale   SubCutaneous Before meals and at bedtime    Topical/Other Medications  lidocaine   4% Patch 1 Patch Transdermal every 24 hours  naloxone Injectable 0.1 milliGRAM(s) IV Push every 3 minutes PRN For ANY of the following changes in patient status:  A. RR LESS THAN 10 breaths per minute, B. Oxygen saturation LESS THAN 90%, C. Sedation score of 6    --------------------------------------------------------------------------------------    VITAL SIGNS:  Vital Signs Last 24 Hrs  T(C): 36.9 (01 Aug 2022 02:00), Max: 37.2 (31 Jul 2022 21:01)  T(F): 98.4 (01 Aug 2022 02:00), Max: 98.9 (31 Jul 2022 21:01)  HR: 78 (01 Aug 2022 02:00) (73 - 83)  BP: 125/65 (01 Aug 2022 02:00) (117/70 - 137/83)  BP(mean): --  RR: 18 (01 Aug 2022 02:00) (18 - 18)  SpO2: 99% (01 Aug 2022 02:00) (97% - 100%)    Parameters below as of 01 Aug 2022 02:00  Patient On (Oxygen Delivery Method): room air      --------------------------------------------------------------------------------------    EXAM    General: NAD, resting in bed comfortably.  Cardiac: regular rate, warm and well perfused  Respiratory: Nonlabored respirations, normal cw expansion.  Abdomen: soft, nontender, nondistended. prevena in place. JPx2 SS  Extremities: normal strength, FROM, no deformities    --------------------------------------------------------------------------------------    LABS                 --------------------------------------------------------------------------------------    I&O's Detail    30 Jul 2022 07:01  -  31 Jul 2022 07:00  --------------------------------------------------------  IN:    dextrose 5% + sodium chloride 0.9%: 546 mL    Oral Fluid: 922 mL  Total IN: 1468 mL    OUT:    Bulb (mL): 210 mL    Bulb (mL): 390 mL    Drain (mL): 0 mL    Voided (mL): 1600 mL  Total OUT: 2200 mL    Total NET: -732 mL      31 Jul 2022 07:01  -  01 Aug 2022 03:19  --------------------------------------------------------  IN:  Total IN: 0 mL    OUT:    Bulb (mL): 330 mL    Bulb (mL): 130 mL    Voided (mL): 2085 mL  Total OUT: 2545 mL    Total NET: -2545 mL        --------------------------------------------------------------------------------------

## 2022-08-02 VITALS
OXYGEN SATURATION: 97 % | DIASTOLIC BLOOD PRESSURE: 68 MMHG | RESPIRATION RATE: 18 BRPM | SYSTOLIC BLOOD PRESSURE: 130 MMHG | TEMPERATURE: 98 F | HEART RATE: 79 BPM

## 2022-08-02 LAB
AMYLASE FLD-CCNC: 20 U/L — SIGNIFICANT CHANGE UP
ANION GAP SERPL CALC-SCNC: 8 MMOL/L — SIGNIFICANT CHANGE UP (ref 7–14)
BUN SERPL-MCNC: 12 MG/DL — SIGNIFICANT CHANGE UP (ref 7–23)
CALCIUM SERPL-MCNC: 8.3 MG/DL — LOW (ref 8.4–10.5)
CHLORIDE SERPL-SCNC: 107 MMOL/L — SIGNIFICANT CHANGE UP (ref 98–107)
CO2 SERPL-SCNC: 23 MMOL/L — SIGNIFICANT CHANGE UP (ref 22–31)
CREAT SERPL-MCNC: 0.81 MG/DL — SIGNIFICANT CHANGE UP (ref 0.5–1.3)
EGFR: 91 ML/MIN/1.73M2 — SIGNIFICANT CHANGE UP
GLUCOSE BLDC GLUCOMTR-MCNC: 131 MG/DL — HIGH (ref 70–99)
GLUCOSE BLDC GLUCOMTR-MCNC: 143 MG/DL — HIGH (ref 70–99)
GLUCOSE SERPL-MCNC: 125 MG/DL — HIGH (ref 70–99)
HCT VFR BLD CALC: 33.8 % — LOW (ref 39–50)
HGB BLD-MCNC: 11.6 G/DL — LOW (ref 13–17)
MAGNESIUM SERPL-MCNC: 2.2 MG/DL — SIGNIFICANT CHANGE UP (ref 1.6–2.6)
MCHC RBC-ENTMCNC: 31.9 PG — SIGNIFICANT CHANGE UP (ref 27–34)
MCHC RBC-ENTMCNC: 34.3 GM/DL — SIGNIFICANT CHANGE UP (ref 32–36)
MCV RBC AUTO: 92.9 FL — SIGNIFICANT CHANGE UP (ref 80–100)
NRBC # BLD: 0 /100 WBCS — SIGNIFICANT CHANGE UP
NRBC # FLD: 0 K/UL — SIGNIFICANT CHANGE UP
PHOSPHATE SERPL-MCNC: 3 MG/DL — SIGNIFICANT CHANGE UP (ref 2.5–4.5)
PLATELET # BLD AUTO: 238 K/UL — SIGNIFICANT CHANGE UP (ref 150–400)
POTASSIUM SERPL-MCNC: 4.3 MMOL/L — SIGNIFICANT CHANGE UP (ref 3.5–5.3)
POTASSIUM SERPL-SCNC: 4.3 MMOL/L — SIGNIFICANT CHANGE UP (ref 3.5–5.3)
RBC # BLD: 3.64 M/UL — LOW (ref 4.2–5.8)
RBC # FLD: 14.2 % — SIGNIFICANT CHANGE UP (ref 10.3–14.5)
SODIUM SERPL-SCNC: 138 MMOL/L — SIGNIFICANT CHANGE UP (ref 135–145)
WBC # BLD: 3.74 K/UL — LOW (ref 3.8–10.5)
WBC # FLD AUTO: 3.74 K/UL — LOW (ref 3.8–10.5)

## 2022-08-02 RX ORDER — PANTOPRAZOLE SODIUM 20 MG/1
1 TABLET, DELAYED RELEASE ORAL
Qty: 30 | Refills: 0
Start: 2022-08-02 | End: 2022-08-31

## 2022-08-02 RX ORDER — OXYCODONE HYDROCHLORIDE 5 MG/1
1 TABLET ORAL
Qty: 28 | Refills: 0
Start: 2022-08-02 | End: 2022-08-08

## 2022-08-02 RX ADMIN — OXYCODONE HYDROCHLORIDE 10 MILLIGRAM(S): 5 TABLET ORAL at 16:40

## 2022-08-02 RX ADMIN — Medication 1000 MILLIGRAM(S): at 07:30

## 2022-08-02 RX ADMIN — Medication 1000 MILLIGRAM(S): at 01:17

## 2022-08-02 RX ADMIN — LIDOCAINE 1 PATCH: 4 CREAM TOPICAL at 04:29

## 2022-08-02 RX ADMIN — Medication 1000 MILLIGRAM(S): at 12:08

## 2022-08-02 RX ADMIN — OXYCODONE HYDROCHLORIDE 10 MILLIGRAM(S): 5 TABLET ORAL at 04:35

## 2022-08-02 RX ADMIN — PANTOPRAZOLE SODIUM 40 MILLIGRAM(S): 20 TABLET, DELAYED RELEASE ORAL at 05:07

## 2022-08-02 RX ADMIN — Medication 1000 MILLIGRAM(S): at 02:18

## 2022-08-02 RX ADMIN — Medication 1000 MILLIGRAM(S): at 07:00

## 2022-08-02 RX ADMIN — HEPARIN SODIUM 5000 UNIT(S): 5000 INJECTION INTRAVENOUS; SUBCUTANEOUS at 05:07

## 2022-08-02 RX ADMIN — OXYCODONE HYDROCHLORIDE 10 MILLIGRAM(S): 5 TABLET ORAL at 05:00

## 2022-08-02 RX ADMIN — OXYCODONE HYDROCHLORIDE 10 MILLIGRAM(S): 5 TABLET ORAL at 16:13

## 2022-08-02 RX ADMIN — FINASTERIDE 5 MILLIGRAM(S): 5 TABLET, FILM COATED ORAL at 12:08

## 2022-08-02 RX ADMIN — OXYCODONE HYDROCHLORIDE 10 MILLIGRAM(S): 5 TABLET ORAL at 10:02

## 2022-08-02 RX ADMIN — Medication 10 MILLIGRAM(S): at 07:45

## 2022-08-02 NOTE — PROGRESS NOTE ADULT - SUBJECTIVE AND OBJECTIVE BOX
D Team Surgery Progress Note     SUBJECTIVE: Patient seen and examined at bedside by team. Patient tolerating diet, denies nausea/vomiting.  +  Flatus/ +   BM. OOB/Amublating as tolerated. Denies fever, chills.    heparin   Injectable 5000 Unit(s) SubCutaneous every 8 hours  tamsulosin 0.4 milliGRAM(s) Oral at bedtime      Vital Signs Last 24 Hrs  T(C): 36.6 (02 Aug 2022 04:26), Max: 36.9 (02 Aug 2022 00:14)  T(F): 97.8 (02 Aug 2022 04:26), Max: 98.4 (02 Aug 2022 00:14)  HR: 66 (02 Aug 2022 04:26) (66 - 78)  BP: 144/67 (02 Aug 2022 04:26) (112/58 - 145/67)  BP(mean): --  RR: 17 (02 Aug 2022 04:26) (16 - 18)  SpO2: 100% (02 Aug 2022 04:26) (100% - 100%)    Parameters below as of 02 Aug 2022 04:26  Patient On (Oxygen Delivery Method): room air      I&O's Detail    01 Aug 2022 07:01  -  02 Aug 2022 07:00  --------------------------------------------------------  IN:    Oral Fluid: 400 mL  Total IN: 400 mL    OUT:    Bulb (mL): 30 mL    Bulb (mL): 615 mL    Voided (mL): 1600 mL  Total OUT: 2245 mL    Total NET: -1845 mL      02 Aug 2022 07:01  -  02 Aug 2022 07:58  --------------------------------------------------------  IN:  Total IN: 0 mL    OUT:    Voided (mL): 300 mL  Total OUT: 300 mL    Total NET: -300 mL          EXAM    General: NAD, resting in bed comfortably.  Cardiac: regular rate, warm and well perfused  Respiratory: Nonlabored respirations  Abdomen: soft, nontender, nondistended, jourdan drain x1 w/ss output        LABS:                        11.6   3.74  )-----------( 238      ( 02 Aug 2022 06:30 )             33.8     08-01    136  |  104  |  9   ----------------------------<  104<H>  3.7   |  22  |  0.74    Ca    7.8<L>      01 Aug 2022 05:24  Phos  2.6     08-01  Mg     2.20     08-01      PT/INR - ( 01 Aug 2022 05:24 )   PT: 13.0 sec;   INR: 1.12 ratio         PTT - ( 01 Aug 2022 05:24 )  PTT:29.7 sec      RADIOLOGY & ADDITIONAL STUDIES:      Assessment	  78 yo M recovering appropriately from ex-lap whipple on 7/27 for IPMN.     Plan  - Whipple pathway   - Diet:  bariatric phase 3  - ISS before meals and at bedtime  -  standing tylenol/po oxy prn pain   - F/u drain amylase  - home meds  - DVT prophylaxis  -Encourage OOB  -dispo planning: home with home PT    Pager [phone]

## 2022-08-02 NOTE — HISTORY OF PRESENT ILLNESS
[de-identified] : Mr. CHRISTOPHER MARTINEZ is a 77 year old male who presents today for an initial consultation. PMH of HTN, BPH, and DM (20 years, on metformin) who recently had microscopic hematuria and underwent workup that included a CT 6/18/22 that demonstrated marked dilation of the main pancreatic duct, no obstructing mass or stone noted. Adjacent pancreatic parenchyma is markedly atrophic. No dilation of the CBD. Underwent an EUS 6/27/22 that demonstrated dilated pancreatic duct measuring up to 14mm in the head, 9.8mm at the neck, 8.7mm at the body and 5.1mm in the tail. No obvious masses noted however, multiple dilated side branches at the head of the pancreas. Cyst fluid analysis- CEA 10,078 and Amylase 1,738. No cancerous cells on path. He is asymptomatic. No EPI symptoms. No hx of pancreatitis. No FHx of cancers.  Presents today to discuss results and recommended treatment plan.

## 2022-08-02 NOTE — PHYSICAL EXAM
[FreeTextEntry1] : General: No acute distress. Well nourished and well kempt.\par Head: AT/NC\par Eyes: PERRL. EOMI.\par Pulmonary: No respiratory distress. \par ABD: Soft. NT/ND. No rebound, no guarding, no rigidity. No peritoneal signs. No masses.  \par Extremities: Warm. No edema. \par Neurological: A&O x 4.\par Psychiatric: Normal affect and mood.\par

## 2022-08-02 NOTE — ASSESSMENT
[FreeTextEntry1] : Mr. CHRISTOPHER MARTINEZ is a 77 year old male who presents today for an initial consultation. PMH of HTN, BPH, and DM (20 years, on metformin) who recently had microscopic hematuria and underwent workup that included a CT 6/18/22 that I reviewed and demonstrates marked dilation of the main pancreatic duct. I do not see a clear mass on imaging and adjacent pancreatic parenchyma is markedly atrophic. He underwent an EUS 6/27/22 that demonstrated dilated pancreatic duct measuring up to 14mm in the head, 9.8mm at the neck, 8.7mm at the body and 5.1mm in the tail. No obvious masses was noted an cyst fluid analysis noted CEA 10,078 and Amylase 1,738 consistent with mucin producing process. Although this was a completely incidental finding and he is asymptomatic, his imaging, EUS and fluid analysis are consistent with a main duct IPMN. \par I discussed that pancreatic cysts can have a range of pathologies, including benign vs malignant. \par Discussed recommendations for resection include: \par 1. Symptomatic polyps (65%)\par 2. Polyps with main duct involvement (65%)\par 3. Polyps in the side branch with a solid component (35%)\par Discussed that main duct IPMNs have a high propensity to degenerate into a cancer and the recommendation is for resection. Reviewed that IPMNs start as low grade dysplasia and over time degenerate into moderate grade and then high grade dysplasia (carcinoma insitu) and finally into invasive cancer. Discussed that the vast majority of pancreatic cancers that we cure are these types of high risk lesions that we resect prior to becoming a full blown cancer.  Although his entire main duct is dilated, the duct in the pancreatic head measures 14mm and is most concerning.  \par I recommend a pancreaticoduodenectomy (Whipple) procedure at this time. Kevin a diagram for the patient and family to better understand the operation. The procedure and associated risks, benefits and possible complications were discussed and all questions were answered. The procedure involves removal of the gallbladder, head of the pancreas, bile duct, duodenum and sometimes a small portion of the stomach and will last 4-6 hours with a hospital stay of about one week. We discussed that the most common complaint after this procedure is fatigue followed by poor appetite. It will take approximately 8-12 weeks to feel "back to normal" after the operation. Risks, benefits and details of the operation were discussed. These include bleeding, infection, damage to surrounding structures, chyle leakage, delayed gastric emptying, cardiopulmonary complications and the risks of anesthesia. Mortality quoted at <1.5%. Discussed that we will check pathology in the OR to ensure there is no HGD at the margin. Discussed that a total pancreatectomy is rarely required. If the final pathology returns as low/mod/high grade dysplasia then no other treatment is needed. If there is an invasive component on final path then we would discuss potential adjuvant treatment recommendations. The patient expressed understanding and the desire to proceed. Our office will contact the patient to schedule.  Recommended he see his PCP for clearance prior to surgery.\par \par Today, I personally spent 60 minutes in total time including reviewing imaging and studies, discussing complex treatment regimens, direct face to face time with the patient, patient education and counseling.\par  \par

## 2022-08-02 NOTE — PROGRESS NOTE ADULT - PROVIDER SPECIALTY LIST ADULT
Anesthesia
Pain Medicine
Surgery
Pain Medicine
Surgery
Surgery
SICU
Surgery

## 2022-08-04 PROBLEM — E11.9 TYPE 2 DIABETES MELLITUS WITHOUT COMPLICATIONS: Chronic | Status: ACTIVE | Noted: 2022-07-19

## 2022-08-04 PROBLEM — Z87.438 PERSONAL HISTORY OF OTHER DISEASES OF MALE GENITAL ORGANS: Chronic | Status: ACTIVE | Noted: 2022-07-19

## 2022-08-04 PROBLEM — L30.9 DERMATITIS, UNSPECIFIED: Chronic | Status: ACTIVE | Noted: 2022-07-19

## 2022-08-04 PROBLEM — U07.1 COVID-19: Chronic | Status: ACTIVE | Noted: 2022-07-19

## 2022-08-04 PROBLEM — I10 ESSENTIAL (PRIMARY) HYPERTENSION: Chronic | Status: ACTIVE | Noted: 2022-07-19

## 2022-08-04 LAB — SURGICAL PATHOLOGY STUDY: SIGNIFICANT CHANGE UP

## 2022-08-12 ENCOUNTER — APPOINTMENT (OUTPATIENT)
Dept: SURGICAL ONCOLOGY | Facility: CLINIC | Age: 77
End: 2022-08-12

## 2022-08-12 VITALS
BODY MASS INDEX: 26.22 KG/M2 | HEIGHT: 63 IN | OXYGEN SATURATION: 98 % | SYSTOLIC BLOOD PRESSURE: 100 MMHG | DIASTOLIC BLOOD PRESSURE: 67 MMHG | TEMPERATURE: 98.2 F | RESPIRATION RATE: 17 BRPM | WEIGHT: 148 LBS | HEART RATE: 81 BPM

## 2022-08-12 PROCEDURE — 99024 POSTOP FOLLOW-UP VISIT: CPT

## 2022-08-30 NOTE — ASSESSMENT
[FreeTextEntry1] : Mr. CHRISTOPHER MARTINEZ is a 77 year old who presents for post-operative evaluation after a whipple operation on 7/27/22. We reviewed final pathology demonstrating IPMN with low and high grade dysplasia. Low grade dysplasia at the pancreatic neck margin. Discussed that this is the best possible pathology report. He had a lesion that was on its way to becoming a full blown invasive cancer and given that it was high grade dysplasia, or carcinoma in situ, he will not require any additional treatment.  Discussed that the remainder of his pancreas is still at risk to potentially develop additional IPMN and will need to be watched. \par We discussed his post-operative and recovery period and restrictions moving forward which include no lifting greater than 10 lbs for 8 weeks. We discussed dietary options and I encouraged the patient to advance as tolerated.  We reviewed s/s of pancreatic insufficiency which include but are not limited to; loose oily bowel movements or bowel movements occurring 30-40 minutes after eating, increased bloating or gas after meals and inability to gain weight.  We discussed the role of pancreatic enzymes in EPI. HAIM drain was removed today in the office without issue. S/S of infection discussed. Will plan to see him back in 6 months with new baseline imaging which he can get locally and do a telelhealth. He will keep the office informed with his recovery and contact us if anything chnages in the interim. Mr. MARTINEZ will return to clinic in 6 months with CT.  \par \par

## 2022-08-30 NOTE — HISTORY OF PRESENT ILLNESS
[FreeTextEntry1] : Mr. CHRISTOPHER MARTINEZ is a 77 year old who presents for post-operative evaluation after a whipple operation on 7/27/22.  He denies abdominal painis not requiring pain medication.  Denies nausea, vomiting or diarrhea. His appetite is improving. His incision is well approximated, c/d/i without drainage.  he has a HAIM drain in place with minimal serosanguineous output, did not have high amylase during hospitalization and was kept in place for high output.  Its decreased to 20cc daily at this time.  \par \par  [Post-Op Complications] : No post-op complications reported

## 2022-08-30 NOTE — PHYSICAL EXAM
[Normal] : oriented to person, place and time, with appropriate affect [de-identified] : incision well approximated, CDI

## 2023-01-20 ENCOUNTER — APPOINTMENT (OUTPATIENT)
Dept: CT IMAGING | Facility: IMAGING CENTER | Age: 78
End: 2023-01-20

## 2023-01-20 ENCOUNTER — APPOINTMENT (OUTPATIENT)
Dept: SURGICAL ONCOLOGY | Facility: CLINIC | Age: 78
End: 2023-01-20

## 2023-07-04 PROBLEM — D49.0 IPMN (INTRADUCTAL PAPILLARY MUCINOUS NEOPLASM): Status: ACTIVE | Noted: 2022-07-13

## 2023-07-07 ENCOUNTER — APPOINTMENT (OUTPATIENT)
Dept: SURGICAL ONCOLOGY | Facility: CLINIC | Age: 78
End: 2023-07-07
Payer: MEDICARE

## 2023-07-07 DIAGNOSIS — D49.0 NEOPLASM OF UNSPECIFIED BEHAVIOR OF DIGESTIVE SYSTEM: ICD-10-CM

## 2023-07-07 PROCEDURE — 99212 OFFICE O/P EST SF 10 MIN: CPT | Mod: 95

## 2023-07-17 NOTE — HISTORY OF PRESENT ILLNESS
[de-identified] : Mr. Esteban Gomez is a 78 year old male presenting for follow-up evaluation. He is status post Whipple procedure on 7/27/2022 for a IPMN. He underwent a surveillance CT on 6/27/2023 and presents to discuss the results.\par \par The patient developed sudden renal failure and was started on dialysis several weeks after receiving his COVID-19 booster. No etiology determined per the patient. Patient states that he lost 40lbs since the start of dialysis. He takes Creon with meals. He complained of SOB in January but denies any recent SOB.

## 2023-07-17 NOTE — REASON FOR VISIT
[Follow-Up Visit] : a follow-up visit for [Home] : at home, [unfilled] , at the time of the visit. [Medical Office: ([facility])___] : at the medical office located in  [FreeTextEntry2] : an IPMN

## 2023-07-17 NOTE — ADDENDUM
[FreeTextEntry1] : By signing my name below, I, Patricio Salcedo, attest that this documentation has been prepared under the direction and in the presence of Prasad Ahn MD.\par \par I, Prasad Ahn MD, personally performed the services described in this documentation. All medical record entries made by the scribe were at my direction and in my presence. I have reviewed the chart and discharge instructions (if applicable) and agree that the record reflects my personal performance and is accurate and complete.

## 2023-07-17 NOTE — ASSESSMENT
[FreeTextEntry1] : Mr. Esteban Gomez is a 78 year old male presenting for follow-up evaluation. He is status post Whipple procedure on 7/27/2022 for a IPMN. He underwent a surveillance CT on 6/27/2023 and presents to discuss the results.\par \par I reviewed and discussed the patients imaging which showed a small to moderate layering right pleural effusion, i believe may be related to dialysis and timing of the scan. Denies any SOB at this time. 5 mm left lower lobe nodule, stable right renal cysts, and no lymphadenopathy. There is no evidence of recurrence on his imaging. Discussed that the remainder of the gland is likely IPMN with low risk of degenerating into a cancer. He will follow-up in one year with a repeat CT scan.\par \par Today, I personally spent 15 minutes in total time including reviewing imaging and studies, discussing complex treatment regimens, direct face to face time with the patient, patient education and counseling.

## 2024-07-12 ENCOUNTER — APPOINTMENT (OUTPATIENT)
Dept: CT IMAGING | Facility: IMAGING CENTER | Age: 79
End: 2024-07-12

## 2024-07-12 ENCOUNTER — APPOINTMENT (OUTPATIENT)
Dept: SURGICAL ONCOLOGY | Facility: CLINIC | Age: 79
End: 2024-07-12
Payer: MEDICARE

## 2024-07-12 VITALS
OXYGEN SATURATION: 98 % | WEIGHT: 130 LBS | HEIGHT: 62 IN | HEART RATE: 73 BPM | RESPIRATION RATE: 17 BRPM | BODY MASS INDEX: 23.92 KG/M2 | DIASTOLIC BLOOD PRESSURE: 71 MMHG | SYSTOLIC BLOOD PRESSURE: 128 MMHG

## 2024-07-12 DIAGNOSIS — D49.0 NEOPLASM OF UNSPECIFIED BEHAVIOR OF DIGESTIVE SYSTEM: ICD-10-CM

## 2024-07-12 PROCEDURE — 99214 OFFICE O/P EST MOD 30 MIN: CPT

## 2025-07-09 ENCOUNTER — NON-APPOINTMENT (OUTPATIENT)
Age: 80
End: 2025-07-09

## 2025-07-11 ENCOUNTER — APPOINTMENT (OUTPATIENT)
Dept: SURGICAL ONCOLOGY | Facility: CLINIC | Age: 80
End: 2025-07-11

## 2025-07-11 PROCEDURE — 99212 OFFICE O/P EST SF 10 MIN: CPT | Mod: 2W

## 2025-07-11 RX ORDER — PANTOPRAZOLE 40 MG/1
40 TABLET, DELAYED RELEASE ORAL TWICE DAILY
Qty: 60 | Refills: 0 | Status: ACTIVE | COMMUNITY
Start: 2025-07-11 | End: 1900-01-01

## (undated) DEVICE — STAPLER ECHELON FLEX POWERED PLUS 440MM

## (undated) DEVICE — SUT PDS II 4-0 27" RB-1

## (undated) DEVICE — ELCTR COLORADO 3CM

## (undated) DEVICE — ELCTR BOVIE BLADE .75"

## (undated) DEVICE — SUT SILK 2-0 18" FS

## (undated) DEVICE — STAPLER ECHELON FLEX POWERED ADV PLCMT TIP

## (undated) DEVICE — FEEDING TUBE 5FR X 16"

## (undated) DEVICE — Device

## (undated) DEVICE — SUT VICRYL 3-0 18" SH UNDYED (POP-OFF)

## (undated) DEVICE — SUT VICRYL 4-0 27" RB-1 UNDYED

## (undated) DEVICE — STAPLER COVIDIEN ENDO GIA SHORT HANDLE

## (undated) DEVICE — SUT PDS II 3-0 27" SH

## (undated) DEVICE — CANISTER DISPOSABLE THIN WALL 3000CC

## (undated) DEVICE — WARMING BLANKET FULL UNDERBODY

## (undated) DEVICE — GLV 8 PROTEXIS (CREAM) MICRO

## (undated) DEVICE — DRAPE LAPAROTOMY TRANSVERSE

## (undated) DEVICE — ELCTR GROUNDING PAD ADULT COVIDIEN

## (undated) DEVICE — SUT MONOCRYL 4-0 27" PS-2 UNDYED

## (undated) DEVICE — ELCTR GROUNDING PAD ADULT CONMED

## (undated) DEVICE — SUT NYLON 2-0 18" FS

## (undated) DEVICE — SUT SILK 3-0 18" TIES

## (undated) DEVICE — SUT SILK 3-0 18" SH (POP-OFF)

## (undated) DEVICE — SUT PROLENE 4-0 36" RB-1

## (undated) DEVICE — FOLEY TRAY 16FR 5CC LF UMETER CLOSED

## (undated) DEVICE — DRSG TAPE UMBILICAL COTTON 2" X 30 X 1/8"

## (undated) DEVICE — POSITIONER STRAP ARMBOARD VELCRO TS-30

## (undated) DEVICE — STAPLER SKIN VISI-STAT 35 WIDE

## (undated) DEVICE — SUT SILK 2-0 18" SH (POP-OFF)

## (undated) DEVICE — SUT PROLENE 3-0 36" SH

## (undated) DEVICE — VESSEL LOOP MAXI-YELLOW  0.120" X 16"

## (undated) DEVICE — SUT PDS II 1 48" TP-1

## (undated) DEVICE — SUT BOOT STANDARD (YELLOW) 5 PAIR

## (undated) DEVICE — SOL IRR POUR H2O 1500ML

## (undated) DEVICE — TOURNIQUET SET SURE-SNARE 22FR (2 TUBES, 2 UMBILICAL TAPES, 2 PLASTIC SNARES) 5"

## (undated) DEVICE — DRAIN RESERVOIR FOR JACKSON PRATT 100CC CARDINAL

## (undated) DEVICE — STAPLER ECHELON FLEX SHAFT 60X280MM

## (undated) DEVICE — ELCTR HANDPIECE ARGON BEND A BEAM 6"

## (undated) DEVICE — WARMING BLANKET LOWER ADULT

## (undated) DEVICE — VENODYNE/SCD SLEEVE CALF MEDIUM

## (undated) DEVICE — DRAPE TOWEL BLUE 17" X 24"

## (undated) DEVICE — DRSG PREVENA PLUS SYSTEM

## (undated) DEVICE — LABELS BLANK W PEN

## (undated) DEVICE — PACK MAJOR ABDOMINAL WITH LAP

## (undated) DEVICE — LIGASURE IMPACT

## (undated) DEVICE — DRAIN PENROSE .25" X 18" LATEX

## (undated) DEVICE — SUT PDS II 5-0 30" RB-2

## (undated) DEVICE — GLV 7.5 PROTEXIS (WHITE)

## (undated) DEVICE — SUT SILK 0 18" TIES

## (undated) DEVICE — SUT SILK 3-0 30" KS

## (undated) DEVICE — ELCTR BOVIE BLADE 3/4" EXTENDED LENGTH 6"

## (undated) DEVICE — DRAPE IOBAN 23" X 23"

## (undated) DEVICE — SPONGE DISSECTOR PEANUT

## (undated) DEVICE — SOL IRR POUR NS 0.9% 1500ML

## (undated) DEVICE — SUT PDS II 5-0 27" RB-1

## (undated) DEVICE — FEEDING TUBE NG ARGYLE PVC 8FR 41CM